# Patient Record
Sex: FEMALE | Race: WHITE | Employment: OTHER | URBAN - METROPOLITAN AREA
[De-identification: names, ages, dates, MRNs, and addresses within clinical notes are randomized per-mention and may not be internally consistent; named-entity substitution may affect disease eponyms.]

---

## 2021-01-01 ENCOUNTER — HOSPITAL ENCOUNTER (INPATIENT)
Age: 71
LOS: 2 days | Discharge: HOSPICE | DRG: 189 | End: 2022-01-01
Attending: EMERGENCY MEDICINE | Admitting: HOSPITALIST
Payer: MEDICARE

## 2021-01-01 ENCOUNTER — APPOINTMENT (OUTPATIENT)
Dept: CT IMAGING | Age: 71
DRG: 189 | End: 2021-01-01
Attending: EMERGENCY MEDICINE
Payer: MEDICARE

## 2021-01-01 ENCOUNTER — APPOINTMENT (OUTPATIENT)
Dept: GENERAL RADIOLOGY | Age: 71
DRG: 189 | End: 2021-01-01
Attending: EMERGENCY MEDICINE
Payer: MEDICARE

## 2021-01-01 ENCOUNTER — HOSPICE ADMISSION (OUTPATIENT)
Dept: HOSPICE | Facility: HOSPICE | Age: 71
End: 2021-01-01
Payer: MEDICARE

## 2021-01-01 DIAGNOSIS — C50.919 METASTATIC BREAST CANCER (HCC): ICD-10-CM

## 2021-01-01 DIAGNOSIS — R06.03 RESPIRATORY DISTRESS: ICD-10-CM

## 2021-01-01 DIAGNOSIS — R04.2 MAJOR HEMOPTYSIS: ICD-10-CM

## 2021-01-01 DIAGNOSIS — R49.0 HOARSE VOICE QUALITY: ICD-10-CM

## 2021-01-01 DIAGNOSIS — E87.6 ACUTE HYPOKALEMIA: ICD-10-CM

## 2021-01-01 DIAGNOSIS — J96.01 ACUTE RESPIRATORY FAILURE WITH HYPOXIA (HCC): Primary | ICD-10-CM

## 2021-01-01 LAB
ABO + RH BLD: NORMAL
ALBUMIN SERPL-MCNC: 2.8 G/DL (ref 3.5–5)
ALBUMIN/GLOB SERPL: 0.6 {RATIO} (ref 1.1–2.2)
ALP SERPL-CCNC: 139 U/L (ref 45–117)
ALT SERPL-CCNC: 30 U/L (ref 12–78)
ANION GAP SERPL CALC-SCNC: 6 MMOL/L (ref 5–15)
ANION GAP SERPL CALC-SCNC: 7 MMOL/L (ref 5–15)
APPEARANCE UR: CLEAR
APTT PPP: 27.2 SEC (ref 22.1–31)
ARTERIAL PATENCY WRIST A: YES
AST SERPL-CCNC: 38 U/L (ref 15–37)
ATRIAL RATE: 122 BPM
BACTERIA URNS QL MICRO: NEGATIVE /HPF
BASE DEFICIT BLDA-SCNC: 0.5 MMOL/L
BASOPHILS # BLD: 0 K/UL (ref 0–0.1)
BASOPHILS NFR BLD: 0 % (ref 0–1)
BDY SITE: NORMAL
BILIRUB SERPL-MCNC: 1.4 MG/DL (ref 0.2–1)
BILIRUB UR QL: NEGATIVE
BLOOD GROUP ANTIBODIES SERPL: NORMAL
BNP SERPL-MCNC: 99 PG/ML
BUN SERPL-MCNC: 8 MG/DL (ref 6–20)
BUN SERPL-MCNC: 8 MG/DL (ref 6–20)
BUN/CREAT SERPL: 12 (ref 12–20)
BUN/CREAT SERPL: 17 (ref 12–20)
CALCIUM SERPL-MCNC: 8.4 MG/DL (ref 8.5–10.1)
CALCIUM SERPL-MCNC: 9 MG/DL (ref 8.5–10.1)
CALCULATED P AXIS, ECG09: 86 DEGREES
CALCULATED R AXIS, ECG10: 29 DEGREES
CALCULATED T AXIS, ECG11: 51 DEGREES
CHLORIDE SERPL-SCNC: 103 MMOL/L (ref 97–108)
CHLORIDE SERPL-SCNC: 110 MMOL/L (ref 97–108)
CO2 SERPL-SCNC: 21 MMOL/L (ref 21–32)
CO2 SERPL-SCNC: 27 MMOL/L (ref 21–32)
COLOR UR: ABNORMAL
COMMENT, HOLDF: NORMAL
COVID-19 RAPID TEST, COVR: NOT DETECTED
CREAT SERPL-MCNC: 0.48 MG/DL (ref 0.55–1.02)
CREAT SERPL-MCNC: 0.68 MG/DL (ref 0.55–1.02)
DIAGNOSIS, 93000: NORMAL
DIFFERENTIAL METHOD BLD: ABNORMAL
EOSINOPHIL # BLD: 0.1 K/UL (ref 0–0.4)
EOSINOPHIL NFR BLD: 1 % (ref 0–7)
EPITH CASTS URNS QL MICRO: ABNORMAL /LPF
ERYTHROCYTE [DISTWIDTH] IN BLOOD BY AUTOMATED COUNT: 14.4 % (ref 11.5–14.5)
ERYTHROCYTE [DISTWIDTH] IN BLOOD BY AUTOMATED COUNT: 14.5 % (ref 11.5–14.5)
GAS FLOW.O2 O2 DELIVERY SYS: 2 L/MIN
GLOBULIN SER CALC-MCNC: 4.6 G/DL (ref 2–4)
GLUCOSE SERPL-MCNC: 132 MG/DL (ref 65–100)
GLUCOSE SERPL-MCNC: 145 MG/DL (ref 65–100)
GLUCOSE UR STRIP.AUTO-MCNC: NEGATIVE MG/DL
HCO3 BLDA-SCNC: 23 MMOL/L (ref 22–26)
HCT VFR BLD AUTO: 38.2 % (ref 35–47)
HCT VFR BLD AUTO: 40.9 % (ref 35–47)
HGB BLD-MCNC: 12.8 G/DL (ref 11.5–16)
HGB BLD-MCNC: 14 G/DL (ref 11.5–16)
HGB UR QL STRIP: ABNORMAL
HYALINE CASTS URNS QL MICRO: ABNORMAL /LPF (ref 0–5)
IMM GRANULOCYTES # BLD AUTO: 0 K/UL (ref 0–0.04)
IMM GRANULOCYTES NFR BLD AUTO: 0 % (ref 0–0.5)
INR PPP: 1.1 (ref 0.9–1.1)
KETONES UR QL STRIP.AUTO: ABNORMAL MG/DL
LEUKOCYTE ESTERASE UR QL STRIP.AUTO: ABNORMAL
LYMPHOCYTES # BLD: 1 K/UL (ref 0.8–3.5)
LYMPHOCYTES NFR BLD: 10 % (ref 12–49)
MCH RBC QN AUTO: 31.2 PG (ref 26–34)
MCH RBC QN AUTO: 32.2 PG (ref 26–34)
MCHC RBC AUTO-ENTMCNC: 33.5 G/DL (ref 30–36.5)
MCHC RBC AUTO-ENTMCNC: 34.2 G/DL (ref 30–36.5)
MCV RBC AUTO: 93.2 FL (ref 80–99)
MCV RBC AUTO: 94 FL (ref 80–99)
MONOCYTES # BLD: 0.9 K/UL (ref 0–1)
MONOCYTES NFR BLD: 8 % (ref 5–13)
NEUTS SEG # BLD: 8.7 K/UL (ref 1.8–8)
NEUTS SEG NFR BLD: 81 % (ref 32–75)
NITRITE UR QL STRIP.AUTO: NEGATIVE
NRBC # BLD: 0 K/UL (ref 0–0.01)
NRBC # BLD: 0 K/UL (ref 0–0.01)
NRBC BLD-RTO: 0 PER 100 WBC
NRBC BLD-RTO: 0 PER 100 WBC
P-R INTERVAL, ECG05: 158 MS
PCO2 BLDA: 36 MMHG (ref 35–45)
PH BLDA: 7.43 [PH] (ref 7.35–7.45)
PH UR STRIP: 6.5 [PH] (ref 5–8)
PLATELET # BLD AUTO: 359 K/UL (ref 150–400)
PLATELET # BLD AUTO: 397 K/UL (ref 150–400)
PMV BLD AUTO: 8.6 FL (ref 8.9–12.9)
PMV BLD AUTO: 8.6 FL (ref 8.9–12.9)
PO2 BLDA: 89 MMHG (ref 80–100)
POTASSIUM SERPL-SCNC: 3.3 MMOL/L (ref 3.5–5.1)
POTASSIUM SERPL-SCNC: 4.6 MMOL/L (ref 3.5–5.1)
PROT SERPL-MCNC: 7.4 G/DL (ref 6.4–8.2)
PROT UR STRIP-MCNC: ABNORMAL MG/DL
PROTHROMBIN TIME: 11.4 SEC (ref 9–11.1)
Q-T INTERVAL, ECG07: 328 MS
QRS DURATION, ECG06: 62 MS
QTC CALCULATION (BEZET), ECG08: 467 MS
RBC # BLD AUTO: 4.1 M/UL (ref 3.8–5.2)
RBC # BLD AUTO: 4.35 M/UL (ref 3.8–5.2)
RBC #/AREA URNS HPF: >100 /HPF (ref 0–5)
SAMPLES BEING HELD,HOLD: NORMAL
SAO2 % BLD: 97 % (ref 92–97)
SAO2% DEVICE SAO2% SENSOR NAME: NORMAL
SODIUM SERPL-SCNC: 136 MMOL/L (ref 136–145)
SODIUM SERPL-SCNC: 138 MMOL/L (ref 136–145)
SOURCE, COVRS: NORMAL
SP GR UR REFRACTOMETRY: 1.01 (ref 1–1.03)
SPECIMEN EXP DATE BLD: NORMAL
SPECIMEN SITE: NORMAL
THERAPEUTIC RANGE,PTTT: NORMAL SECS (ref 58–77)
TROPONIN-HIGH SENSITIVITY: 25 NG/L (ref 0–51)
UA: UC IF INDICATED,UAUC: ABNORMAL
UROBILINOGEN UR QL STRIP.AUTO: 1 EU/DL (ref 0.2–1)
VENTRICULAR RATE, ECG03: 122 BPM
WBC # BLD AUTO: 10.8 K/UL (ref 3.6–11)
WBC # BLD AUTO: 13.8 K/UL (ref 3.6–11)
WBC URNS QL MICRO: ABNORMAL /HPF (ref 0–4)

## 2021-01-01 PROCEDURE — 36415 COLL VENOUS BLD VENIPUNCTURE: CPT

## 2021-01-01 PROCEDURE — 94760 N-INVAS EAR/PLS OXIMETRY 1: CPT

## 2021-01-01 PROCEDURE — 36600 WITHDRAWAL OF ARTERIAL BLOOD: CPT

## 2021-01-01 PROCEDURE — 84484 ASSAY OF TROPONIN QUANT: CPT

## 2021-01-01 PROCEDURE — 80048 BASIC METABOLIC PNL TOTAL CA: CPT

## 2021-01-01 PROCEDURE — 83880 ASSAY OF NATRIURETIC PEPTIDE: CPT

## 2021-01-01 PROCEDURE — 65660000000 HC RM CCU STEPDOWN

## 2021-01-01 PROCEDURE — 86900 BLOOD TYPING SEROLOGIC ABO: CPT

## 2021-01-01 PROCEDURE — 80053 COMPREHEN METABOLIC PANEL: CPT

## 2021-01-01 PROCEDURE — 94640 AIRWAY INHALATION TREATMENT: CPT

## 2021-01-01 PROCEDURE — 74011000250 HC RX REV CODE- 250: Performed by: EMERGENCY MEDICINE

## 2021-01-01 PROCEDURE — 71045 X-RAY EXAM CHEST 1 VIEW: CPT

## 2021-01-01 PROCEDURE — 77010033678 HC OXYGEN DAILY

## 2021-01-01 PROCEDURE — 99285 EMERGENCY DEPT VISIT HI MDM: CPT

## 2021-01-01 PROCEDURE — 87635 SARS-COV-2 COVID-19 AMP PRB: CPT

## 2021-01-01 PROCEDURE — 85610 PROTHROMBIN TIME: CPT

## 2021-01-01 PROCEDURE — 74011250636 HC RX REV CODE- 250/636: Performed by: HOSPITALIST

## 2021-01-01 PROCEDURE — 74011000250 HC RX REV CODE- 250: Performed by: HOSPITALIST

## 2021-01-01 PROCEDURE — 85730 THROMBOPLASTIN TIME PARTIAL: CPT

## 2021-01-01 PROCEDURE — 74011250637 HC RX REV CODE- 250/637: Performed by: HOSPITALIST

## 2021-01-01 PROCEDURE — 74177 CT ABD & PELVIS W/CONTRAST: CPT

## 2021-01-01 PROCEDURE — 85027 COMPLETE CBC AUTOMATED: CPT

## 2021-01-01 PROCEDURE — 93005 ELECTROCARDIOGRAM TRACING: CPT

## 2021-01-01 PROCEDURE — 74011250636 HC RX REV CODE- 250/636: Performed by: EMERGENCY MEDICINE

## 2021-01-01 PROCEDURE — 85025 COMPLETE CBC W/AUTO DIFF WBC: CPT

## 2021-01-01 PROCEDURE — 74011000636 HC RX REV CODE- 636: Performed by: EMERGENCY MEDICINE

## 2021-01-01 PROCEDURE — 96374 THER/PROPH/DIAG INJ IV PUSH: CPT

## 2021-01-01 PROCEDURE — 81001 URINALYSIS AUTO W/SCOPE: CPT

## 2021-01-01 PROCEDURE — 71275 CT ANGIOGRAPHY CHEST: CPT

## 2021-01-01 PROCEDURE — 82803 BLOOD GASES ANY COMBINATION: CPT

## 2021-01-01 RX ORDER — ENOXAPARIN SODIUM 100 MG/ML
40 INJECTION SUBCUTANEOUS DAILY
Status: DISCONTINUED | OUTPATIENT
Start: 2021-01-01 | End: 2021-01-01 | Stop reason: CLARIF

## 2021-01-01 RX ORDER — ACETAMINOPHEN 650 MG/1
650 SUPPOSITORY RECTAL
Status: DISCONTINUED | OUTPATIENT
Start: 2021-01-01 | End: 2022-01-01 | Stop reason: HOSPADM

## 2021-01-01 RX ORDER — POLYETHYLENE GLYCOL 3350 17 G/17G
17 POWDER, FOR SOLUTION ORAL DAILY PRN
Status: DISCONTINUED | OUTPATIENT
Start: 2021-01-01 | End: 2022-01-01 | Stop reason: HOSPADM

## 2021-01-01 RX ORDER — FLUNISOLIDE 0.25 MG/ML
1 SOLUTION NASAL DAILY
Status: DISCONTINUED | OUTPATIENT
Start: 2021-01-01 | End: 2021-01-01 | Stop reason: CLARIF

## 2021-01-01 RX ORDER — ACETAMINOPHEN 325 MG/1
650 TABLET ORAL
Status: DISCONTINUED | OUTPATIENT
Start: 2021-01-01 | End: 2022-01-01 | Stop reason: HOSPADM

## 2021-01-01 RX ORDER — PROCHLORPERAZINE MALEATE 10 MG
10 TABLET ORAL
Status: DISCONTINUED | OUTPATIENT
Start: 2021-01-01 | End: 2022-01-01 | Stop reason: HOSPADM

## 2021-01-01 RX ORDER — SODIUM CHLORIDE AND POTASSIUM CHLORIDE .9; .15 G/100ML; G/100ML
SOLUTION INTRAVENOUS CONTINUOUS
Status: DISPENSED | OUTPATIENT
Start: 2021-01-01 | End: 2021-01-01

## 2021-01-01 RX ORDER — ONDANSETRON 2 MG/ML
4 INJECTION INTRAMUSCULAR; INTRAVENOUS
Status: DISCONTINUED | OUTPATIENT
Start: 2021-01-01 | End: 2022-01-01 | Stop reason: HOSPADM

## 2021-01-01 RX ORDER — SODIUM CHLORIDE 0.9 % (FLUSH) 0.9 %
5-40 SYRINGE (ML) INJECTION EVERY 8 HOURS
Status: DISCONTINUED | OUTPATIENT
Start: 2021-01-01 | End: 2022-01-01 | Stop reason: HOSPADM

## 2021-01-01 RX ORDER — LEVALBUTEROL INHALATION SOLUTION 1.25 MG/3ML
1.25 SOLUTION RESPIRATORY (INHALATION)
Status: COMPLETED | OUTPATIENT
Start: 2021-01-01 | End: 2021-01-01

## 2021-01-01 RX ORDER — LORAZEPAM 0.5 MG/1
0.5 TABLET ORAL
COMMUNITY
Start: 2022-01-01 | End: 2022-01-01

## 2021-01-01 RX ORDER — GUAIFENESIN 600 MG/1
1200 TABLET, EXTENDED RELEASE ORAL 2 TIMES DAILY
Status: DISCONTINUED | OUTPATIENT
Start: 2021-01-01 | End: 2022-01-01 | Stop reason: HOSPADM

## 2021-01-01 RX ORDER — HYDROCODONE POLISTIREX AND CHLORPHENIRAMINE POLISTIREX 10; 8 MG/5ML; MG/5ML
5 SUSPENSION, EXTENDED RELEASE ORAL
COMMUNITY
Start: 2022-01-01

## 2021-01-01 RX ORDER — MEGESTROL ACETATE 40 MG/ML
800 SUSPENSION ORAL DAILY
COMMUNITY

## 2021-01-01 RX ORDER — GUAIFENESIN 1200 MG/1
1200 TABLET, EXTENDED RELEASE ORAL 2 TIMES DAILY
COMMUNITY
Start: 2022-01-01 | End: 2022-01-01

## 2021-01-01 RX ORDER — PREDNISONE 20 MG/1
20 TABLET ORAL
Status: DISCONTINUED | OUTPATIENT
Start: 2022-01-01 | End: 2022-01-01 | Stop reason: HOSPADM

## 2021-01-01 RX ORDER — LORATADINE 10 MG/1
10 TABLET ORAL DAILY
COMMUNITY
Start: 2022-01-01

## 2021-01-01 RX ORDER — LEVETIRACETAM 500 MG/1
500 TABLET ORAL 2 TIMES DAILY
COMMUNITY
Start: 2022-01-01 | End: 2022-01-01

## 2021-01-01 RX ORDER — MEGESTROL ACETATE 40 MG/ML
800 SUSPENSION ORAL DAILY
Status: DISCONTINUED | OUTPATIENT
Start: 2021-01-01 | End: 2022-01-01 | Stop reason: HOSPADM

## 2021-01-01 RX ORDER — SODIUM CHLORIDE 0.9 % (FLUSH) 0.9 %
5-40 SYRINGE (ML) INJECTION AS NEEDED
Status: DISCONTINUED | OUTPATIENT
Start: 2021-01-01 | End: 2022-01-01 | Stop reason: HOSPADM

## 2021-01-01 RX ORDER — HEPARIN SODIUM 5000 [USP'U]/ML
5000 INJECTION, SOLUTION INTRAVENOUS; SUBCUTANEOUS EVERY 12 HOURS
Status: DISCONTINUED | OUTPATIENT
Start: 2021-01-01 | End: 2021-01-01

## 2021-01-01 RX ORDER — LEVALBUTEROL INHALATION SOLUTION 0.63 MG/3ML
0.63 SOLUTION RESPIRATORY (INHALATION)
Status: DISCONTINUED | OUTPATIENT
Start: 2021-01-01 | End: 2022-01-01 | Stop reason: HOSPADM

## 2021-01-01 RX ORDER — LEVETIRACETAM 500 MG/1
500 TABLET ORAL 2 TIMES DAILY
Status: DISCONTINUED | OUTPATIENT
Start: 2021-01-01 | End: 2022-01-01 | Stop reason: HOSPADM

## 2021-01-01 RX ORDER — ONDANSETRON HYDROCHLORIDE 8 MG/1
8 TABLET, FILM COATED ORAL
COMMUNITY
Start: 2022-01-01

## 2021-01-01 RX ORDER — HYDROCODONE POLISTIREX AND CHLORPHENIRAMINE POLISTIREX 10; 8 MG/5ML; MG/5ML
5 SUSPENSION, EXTENDED RELEASE ORAL
Status: DISCONTINUED | OUTPATIENT
Start: 2021-01-01 | End: 2021-01-01 | Stop reason: SDUPTHER

## 2021-01-01 RX ORDER — PROCHLORPERAZINE MALEATE 10 MG
10 TABLET ORAL
COMMUNITY
Start: 2022-01-01 | End: 2022-01-01 | Stop reason: CLARIF

## 2021-01-01 RX ORDER — LORAZEPAM 0.5 MG/1
0.5 TABLET ORAL
Status: DISCONTINUED | OUTPATIENT
Start: 2021-01-01 | End: 2022-01-01 | Stop reason: HOSPADM

## 2021-01-01 RX ORDER — OXYCODONE AND ACETAMINOPHEN 5; 325 MG/1; MG/1
1 TABLET ORAL
Status: DISCONTINUED | OUTPATIENT
Start: 2021-01-01 | End: 2022-01-01 | Stop reason: HOSPADM

## 2021-01-01 RX ORDER — HYDROCODONE POLISTIREX AND CHLORPHENIRAMINE POLISTIREX 10; 8 MG/5ML; MG/5ML
5 SUSPENSION, EXTENDED RELEASE ORAL
Status: DISCONTINUED | OUTPATIENT
Start: 2021-01-01 | End: 2022-01-01 | Stop reason: HOSPADM

## 2021-01-01 RX ORDER — ONDANSETRON 4 MG/1
4 TABLET, ORALLY DISINTEGRATING ORAL
Status: DISCONTINUED | OUTPATIENT
Start: 2021-01-01 | End: 2022-01-01 | Stop reason: HOSPADM

## 2021-01-01 RX ORDER — CETIRIZINE HCL 10 MG
10 TABLET ORAL DAILY
Status: DISCONTINUED | OUTPATIENT
Start: 2021-01-01 | End: 2022-01-01 | Stop reason: HOSPADM

## 2021-01-01 RX ADMIN — LEVALBUTEROL HYDROCHLORIDE 1.25 MG: 1.25 SOLUTION RESPIRATORY (INHALATION) at 06:09

## 2021-01-01 RX ADMIN — IOPAMIDOL 100 ML: 755 INJECTION, SOLUTION INTRAVENOUS at 05:17

## 2021-01-01 RX ADMIN — GUAIFENESIN 1200 MG: 600 TABLET, EXTENDED RELEASE ORAL at 17:10

## 2021-01-01 RX ADMIN — Medication 10 ML: at 22:00

## 2021-01-01 RX ADMIN — METHYLPREDNISOLONE SODIUM SUCCINATE 125 MG: 125 INJECTION, POWDER, FOR SOLUTION INTRAMUSCULAR; INTRAVENOUS at 04:36

## 2021-01-01 RX ADMIN — METHYLPREDNISOLONE SODIUM SUCCINATE 40 MG: 40 INJECTION, POWDER, FOR SOLUTION INTRAMUSCULAR; INTRAVENOUS at 17:32

## 2021-01-01 RX ADMIN — LEVETIRACETAM 500 MG: 500 TABLET, FILM COATED ORAL at 10:52

## 2021-01-01 RX ADMIN — GUAIFENESIN 1200 MG: 600 TABLET, EXTENDED RELEASE ORAL at 18:53

## 2021-01-01 RX ADMIN — MEGESTROL ACETATE 800 MG: 40 SUSPENSION ORAL at 13:50

## 2021-01-01 RX ADMIN — METHYLPREDNISOLONE SODIUM SUCCINATE 40 MG: 40 INJECTION, POWDER, FOR SOLUTION INTRAMUSCULAR; INTRAVENOUS at 10:53

## 2021-01-01 RX ADMIN — POTASSIUM CHLORIDE AND SODIUM CHLORIDE: 900; 150 INJECTION, SOLUTION INTRAVENOUS at 09:49

## 2021-01-01 RX ADMIN — HYDROCODONE POLISTIREX AND CHLORPHENIRAMINE POLISTIREX 5 ML: 10; 8 SUSPENSION, EXTENDED RELEASE ORAL at 20:54

## 2021-01-01 RX ADMIN — HYDROCODONE POLISTIREX AND CHLORPHENIRAMINE POLISTIREX 5 ML: 10; 8 SUSPENSION, EXTENDED RELEASE ORAL at 23:03

## 2021-01-01 RX ADMIN — Medication 10 ML: at 09:53

## 2021-01-01 RX ADMIN — METHYLPREDNISOLONE SODIUM SUCCINATE 40 MG: 40 INJECTION, POWDER, FOR SOLUTION INTRAMUSCULAR; INTRAVENOUS at 03:42

## 2021-01-01 RX ADMIN — GUAIFENESIN 1200 MG: 600 TABLET, EXTENDED RELEASE ORAL at 10:51

## 2021-01-01 RX ADMIN — LEVETIRACETAM 500 MG: 500 TABLET, FILM COATED ORAL at 18:53

## 2021-01-01 RX ADMIN — Medication 10 ML: at 14:00

## 2021-01-01 RX ADMIN — LEVALBUTEROL HYDROCHLORIDE 0.63 MG: 0.63 SOLUTION RESPIRATORY (INHALATION) at 22:10

## 2021-01-01 RX ADMIN — LEVALBUTEROL HYDROCHLORIDE 0.63 MG: 0.63 SOLUTION RESPIRATORY (INHALATION) at 19:44

## 2021-01-01 RX ADMIN — Medication 10 ML: at 11:00

## 2021-01-01 RX ADMIN — CETIRIZINE HYDROCHLORIDE 10 MG: 10 TABLET, FILM COATED ORAL at 10:57

## 2021-01-01 RX ADMIN — METHYLPREDNISOLONE SODIUM SUCCINATE 40 MG: 40 INJECTION, POWDER, FOR SOLUTION INTRAMUSCULAR; INTRAVENOUS at 09:42

## 2021-01-01 RX ADMIN — MEGESTROL ACETATE 800 MG: 40 SUSPENSION ORAL at 10:52

## 2021-01-01 RX ADMIN — Medication 10 ML: at 05:50

## 2021-01-01 RX ADMIN — CETIRIZINE HYDROCHLORIDE 10 MG: 10 TABLET, FILM COATED ORAL at 09:40

## 2021-01-01 RX ADMIN — LEVETIRACETAM 500 MG: 500 TABLET, FILM COATED ORAL at 09:41

## 2021-01-01 RX ADMIN — GUAIFENESIN 1200 MG: 600 TABLET, EXTENDED RELEASE ORAL at 09:40

## 2021-01-01 RX ADMIN — LEVETIRACETAM 500 MG: 500 TABLET, FILM COATED ORAL at 17:10

## 2021-01-01 RX ADMIN — POTASSIUM BICARBONATE 40 MEQ: 782 TABLET, EFFERVESCENT ORAL at 09:42

## 2021-12-30 PROBLEM — J96.01 ACUTE RESPIRATORY FAILURE WITH HYPOXIA (HCC): Status: ACTIVE | Noted: 2021-01-01

## 2021-12-30 PROBLEM — C50.919 METASTATIC BREAST CANCER (HCC): Status: ACTIVE | Noted: 2021-01-01

## 2021-12-30 PROBLEM — E87.6 HYPOKALEMIA: Status: ACTIVE | Noted: 2021-01-01

## 2021-12-30 NOTE — ED NOTES
Patient arrives from home via EMS for shortness of breath.  Hx of Stage 4 Breast Cx with lymph nodes swollen near wind pipe causing increased work of breathing    Patient states she was having generalized chest tightness prior to coming by ems    Patient states she has coughed up blood past 2 days which is new, also had bloody vaginal discharge x1 which is also new

## 2021-12-30 NOTE — ED PROVIDER NOTES
EMERGENCY DEPARTMENT HISTORY AND PHYSICAL EXAM      Please note that this dictation was completed with the assistance of \"Dragon\", the computer voice recognition software. Quite often unanticipated grammatical, syntax, homophones, and other interpretive errors are inadvertently transcribed by the computer software. Please disregard these errors and any errors that have escaped final proofreading. Thank you. Patient: Daniel Valle  DOS: 19  : 1950  MRN: [id number]  History of Presenting Illness     Chief Complaint   Patient presents with    Shortness of Breath     Patient arrives from home via EMS for shortness of breath. Hx of Stage 4 Breast Cx with lymph nodes swollen near wind pipe causing increased work of breathing    Chest Pain     Patient states she was having generalized chest tightness prior to coming by ems    Other     Patient states she has cough up blood past 2 days which is new, also had bloody vaginal discharge x1 which is also new     History Provided By: Patient/family/EMS (if applicable)    HPI: Daniel Valle, 66 y.o. female with past medical history as documented below presents to the ED with c/o of acute onset of SOB, hemoptysis and possibly vaginal bleeding occurring just PTA. Pt has hx of stage 4 breast cancer with lymph nodes swollen around windpipe and states she's had increased SBO. Also notes having blood tinged sputum when coughing the past couple days. Pt arrives in respiratory distress, tachycardic and sats in the low 80%. Pt denies any other exacerbating or ameliorating factors. Additionally, pt specifically denies any recent fever, chills, headache, nausea, vomiting, abdominal pain, lightheadedness, dizziness, numbness, weakness, lower extremity swelling, heart palpitations, urinary sxs, diarrhea, constipation, melena, hematochezia. There are no other complaints, changes or physical findings pertinent to the HPI at this time.     PCP: Eris Sterling, DO  Past History   Past Medical History:  Stage 4 breast cancer    Past Surgical History:  Denies    Family History:   Family history reviewed and was non-contributory, unless specified below:    Social History:  Social History     Tobacco Use    Smoking status: Not on file    Smokeless tobacco: Not on file   Substance Use Topics    Alcohol use: Not on file    Drug use: Not on file       Allergies: Allergies   Allergen Reactions    Barley Other (comments)     GI problems    Black Pepper Other (comments)     GI problems    Cinnamon Other (comments)     GI problems    Cottonseed Oil Other (comments)     GI problems    Malt Extract Other (comments)     GI problems    Oats Other (comments)     GI problems    Pineapple Other (comments)     GI problems       Current Medications:  No current facility-administered medications on file prior to encounter. Current Outpatient Medications on File Prior to Encounter   Medication Sig Dispense Refill    levETIRAcetam (Keppra) 500 mg tablet Take 500 mg by mouth two (2) times a day.  megestroL (MEGACE) 400 mg/10 mL (40 mg/mL) suspension Take 800 mg by mouth daily.  LORazepam (Ativan) 0.5 mg tablet Take 0.5 mg by mouth every six (6) hours as needed for Anxiety.  HYDROcodone-chlorpheniramine (TUSSIONEX) 10-8 mg/5 mL suspension Take 5 mL by mouth every twelve (12) hours as needed for Cough.  prochlorperazine (COMPAZINE) 10 mg tablet Take 10 mg by mouth every six (6) hours as needed for Nausea or Vomiting.  ondansetron hcl (Zofran) 8 mg tablet Take 8 mg by mouth every eight (8) hours as needed for Nausea or Vomiting.  loratadine (CLARITIN) 10 mg tablet Take 10 mg by mouth daily.  guaiFENesin (Mucinex) 1,200 mg Ta12 ER tablet Take 1,200 mg by mouth two (2) times a day.  fluticasone propionate (FLONASE NA) 1 Spray by Nasal route daily.        Review of Systems   A complete ROS was reviewed by me today and all other systems negative, unless otherwise specified below:  Review of Systems   Constitutional: Positive for activity change and appetite change. Negative for chills and fever. HENT: Negative. Negative for congestion and sore throat. Eyes: Negative. Respiratory: Positive for cough, chest tightness and shortness of breath. Negative for wheezing. Cardiovascular: Positive for chest pain. Negative for palpitations and leg swelling. Gastrointestinal: Negative. Negative for abdominal distention, abdominal pain, blood in stool, constipation, diarrhea, nausea and vomiting. Endocrine: Negative. Genitourinary: Positive for vaginal bleeding. Negative for dysuria, flank pain, frequency, hematuria and urgency. Musculoskeletal: Negative. Negative for arthralgias, back pain and myalgias. Skin: Negative. Negative for color change and rash. Neurological: Negative. Negative for dizziness, syncope, speech difficulty, weakness, light-headedness, numbness and headaches. Hematological: Negative. Psychiatric/Behavioral: Negative. Negative for confusion and self-injury. The patient is not nervous/anxious. All other systems reviewed and are negative. Physical Exam   Physical Exam  Vitals and nursing note reviewed. Constitutional:       General: She is in acute distress. Appearance: She is well-developed. She is ill-appearing, toxic-appearing and diaphoretic. HENT:      Head: Normocephalic and atraumatic. Mouth/Throat:      Pharynx: No oropharyngeal exudate. Eyes:      Conjunctiva/sclera: Conjunctivae normal.   Cardiovascular:      Rate and Rhythm: Regular rhythm. Tachycardia present. Heart sounds: Normal heart sounds. Pulmonary:      Effort: Tachypnea, accessory muscle usage and respiratory distress present. Breath sounds: Normal breath sounds. No wheezing or rales. Chest:      Chest wall: No tenderness. Abdominal:      General: Bowel sounds are normal. There is no distension.       Palpations: Abdomen is soft. There is no mass. Tenderness: There is no abdominal tenderness. There is no guarding or rebound. Musculoskeletal:         General: Normal range of motion. Cervical back: Normal range of motion. Skin:     General: Skin is warm. Neurological:      Mental Status: She is alert and oriented to person, place, and time. Cranial Nerves: No cranial nerve deficit. Motor: No abnormal muscle tone. Diagnostic Study Results     Laboratory Data  I have personally reviewed and interpreted all available laboratory results. Recent Results (from the past 24 hour(s))   CBC WITH AUTOMATED DIFF    Collection Time: 12/30/21  3:42 AM   Result Value Ref Range    WBC 10.8 3.6 - 11.0 K/uL    RBC 4.35 3.80 - 5.20 M/uL    HGB 14.0 11.5 - 16.0 g/dL    HCT 40.9 35.0 - 47.0 %    MCV 94.0 80.0 - 99.0 FL    MCH 32.2 26.0 - 34.0 PG    MCHC 34.2 30.0 - 36.5 g/dL    RDW 14.5 11.5 - 14.5 %    PLATELET 245 608 - 013 K/uL    MPV 8.6 (L) 8.9 - 12.9 FL    NRBC 0.0 0  WBC    ABSOLUTE NRBC 0.00 0.00 - 0.01 K/uL    NEUTROPHILS 81 (H) 32 - 75 %    LYMPHOCYTES 10 (L) 12 - 49 %    MONOCYTES 8 5 - 13 %    EOSINOPHILS 1 0 - 7 %    BASOPHILS 0 0 - 1 %    IMMATURE GRANULOCYTES 0 0.0 - 0.5 %    ABS. NEUTROPHILS 8.7 (H) 1.8 - 8.0 K/UL    ABS. LYMPHOCYTES 1.0 0.8 - 3.5 K/UL    ABS. MONOCYTES 0.9 0.0 - 1.0 K/UL    ABS. EOSINOPHILS 0.1 0.0 - 0.4 K/UL    ABS. BASOPHILS 0.0 0.0 - 0.1 K/UL    ABS. IMM.  GRANS. 0.0 0.00 - 0.04 K/UL    DF AUTOMATED     METABOLIC PANEL, COMPREHENSIVE    Collection Time: 12/30/21  3:42 AM   Result Value Ref Range    Sodium 136 136 - 145 mmol/L    Potassium 3.3 (L) 3.5 - 5.1 mmol/L    Chloride 103 97 - 108 mmol/L    CO2 27 21 - 32 mmol/L    Anion gap 6 5 - 15 mmol/L    Glucose 132 (H) 65 - 100 mg/dL    BUN 8 6 - 20 MG/DL    Creatinine 0.68 0.55 - 1.02 MG/DL    BUN/Creatinine ratio 12 12 - 20      GFR est AA >60 >60 ml/min/1.73m2    GFR est non-AA >60 >60 ml/min/1.73m2    Calcium 9.0 8.5 - 10.1 MG/DL    Bilirubin, total 1.4 (H) 0.2 - 1.0 MG/DL    ALT (SGPT) 30 12 - 78 U/L    AST (SGOT) 38 (H) 15 - 37 U/L    Alk. phosphatase 139 (H) 45 - 117 U/L    Protein, total 7.4 6.4 - 8.2 g/dL    Albumin 2.8 (L) 3.5 - 5.0 g/dL    Globulin 4.6 (H) 2.0 - 4.0 g/dL    A-G Ratio 0.6 (L) 1.1 - 2.2     TROPONIN-HIGH SENSITIVITY    Collection Time: 12/30/21  3:42 AM   Result Value Ref Range    Troponin-High Sensitivity 25 0 - 51 ng/L   NT-PRO BNP    Collection Time: 12/30/21  3:42 AM   Result Value Ref Range    NT pro-BNP 99 <125 PG/ML   EKG, 12 LEAD, INITIAL    Collection Time: 12/30/21  3:45 AM   Result Value Ref Range    Ventricular Rate 122 BPM    Atrial Rate 122 BPM    P-R Interval 158 ms    QRS Duration 62 ms    Q-T Interval 328 ms    QTC Calculation (Bezet) 467 ms    Calculated P Axis 86 degrees    Calculated R Axis 29 degrees    Calculated T Axis 51 degrees    Diagnosis       Sinus tachycardia  Possible Left atrial enlargement  Possible Anterior infarct , age undetermined  No previous ECGs available     PTT    Collection Time: 12/30/21  3:55 AM   Result Value Ref Range    aPTT 27.2 22.1 - 31.0 sec    aPTT, therapeutic range     58.0 - 77.0 SECS   PROTHROMBIN TIME + INR    Collection Time: 12/30/21  3:55 AM   Result Value Ref Range    INR 1.1 0.9 - 1.1      Prothrombin time 11.4 (H) 9.0 - 11.1 sec   TYPE & SCREEN    Collection Time: 12/30/21  3:55 AM   Result Value Ref Range    Crossmatch Expiration 01/02/2022,2359     ABO/Rh(D) O NEGATIVE     Antibody screen NEG    SAMPLES BEING HELD    Collection Time: 12/30/21  3:55 AM   Result Value Ref Range    SAMPLES BEING HELD BL     COMMENT        Add-on orders for these samples will be processed based on acceptable specimen integrity and analyte stability, which may vary by analyte.    COVID-19 RAPID TEST    Collection Time: 12/30/21  4:27 AM   Result Value Ref Range    Specimen source Nasopharyngeal      COVID-19 rapid test Not detected NOTD     URINALYSIS W/ REFLEX CULTURE    Collection Time: 12/30/21  5:03 AM    Specimen: Urine   Result Value Ref Range    Color YELLOW/STRAW      Appearance CLEAR CLEAR      Specific gravity 1.006 1.003 - 1.030      pH (UA) 6.5 5.0 - 8.0      Protein TRACE (A) NEG mg/dL    Glucose Negative NEG mg/dL    Ketone TRACE (A) NEG mg/dL    Bilirubin Negative NEG      Blood LARGE (A) NEG      Urobilinogen 1.0 0.2 - 1.0 EU/dL    Nitrites Negative NEG      Leukocyte Esterase TRACE (A) NEG      WBC 0-4 0 - 4 /hpf    RBC >100 (H) 0 - 5 /hpf    Epithelial cells FEW FEW /lpf    Bacteria Negative NEG /hpf    UA:UC IF INDICATED CULTURE NOT INDICATED BY UA RESULT CNI      Hyaline cast 0-2 0 - 5 /lpf   BLOOD GAS, ARTERIAL    Collection Time: 12/30/21  6:04 AM   Result Value Ref Range    pH 7.43 7.35 - 7.45      PCO2 36 35 - 45 mmHg    PO2 89 80 - 100 mmHg    O2 SAT 97 92 - 97 %    BICARBONATE 23 22 - 26 mmol/L    BASE DEFICIT 0.5 mmol/L    O2 METHOD NASAL CANNULA      O2 FLOW RATE 2.00 L/min    Sample source ARTERIAL      SITE LEFT RADIAL      BREE'S TEST YES         Radiologic Studies   I have personally reviewed and interpreted all available imaging studies and agree with radiology interpretation. CTA CHEST W OR W WO CONT   Final Result   Extensive hilar and mediastinal lymphadenopathy with left axillary adenopathy   and supraclavicular adenopathy as well. Right upper lobe pleural-based mass with numerous scattered pulmonary nodular   densities. Scattered intimal lytic osseous lesions. Left adrenal mass lesion. Imaging findings consistent with widespread metastatic disease. No evidence of pulmonary embolism. No aortic aneurysm or dissection. Mild centrilobular emphysematous change. No acute process is identified in the chest, abdomen or pelvis. CT ABD PELV W CONT   Final Result   Extensive hilar and mediastinal lymphadenopathy with left axillary adenopathy   and supraclavicular adenopathy as well.    Right upper lobe pleural-based mass with numerous scattered pulmonary nodular   densities. Scattered intimal lytic osseous lesions. Left adrenal mass lesion. Imaging findings consistent with widespread metastatic disease. No evidence of pulmonary embolism. No aortic aneurysm or dissection. Mild centrilobular emphysematous change. No acute process is identified in the chest, abdomen or pelvis. XR CHEST PORT   Final Result   Mild lateral hilar prominence. Hilar adenopathy is not excluded. Axillary surgical clips bilaterally. CT Results  (Last 48 hours)               12/30/21 0517  CTA CHEST W OR W WO CONT Final result    Impression:  Extensive hilar and mediastinal lymphadenopathy with left axillary adenopathy   and supraclavicular adenopathy as well. Right upper lobe pleural-based mass with numerous scattered pulmonary nodular   densities. Scattered intimal lytic osseous lesions. Left adrenal mass lesion. Imaging findings consistent with widespread metastatic disease. No evidence of pulmonary embolism. No aortic aneurysm or dissection. Mild centrilobular emphysematous change. No acute process is identified in the chest, abdomen or pelvis. Narrative:      CLINICAL HISTORY: Hemoptysis, chest pain, lung cancer       INDICATION:  Hemoptysis, chest pain and lung cancer   COMPARISON:  None       TECHNIQUE:    Following the uneventful intravenous administration of 100 cc Isovue-370, thin   axial images were obtained through the chest, abdomen and pelvis. Coronal and   sagittal reconstructions were generated. Oral contrast was not administered. CT dose reduction was achieved through use of a standardized protocol tailored   for this examination and automatic exposure control for dose modulation. Adaptive statistical iterative reconstruction (ASIR) was utilized. For the chest portion of the examination only;    3-D MIP reconstructed imaging was obtained.    FINDINGS:    VASCULATURE: Supraclavicular adenopathy. Thyroid hypodensities are not   characterized. No aortic aneurysm or dissection. No proximal pulmonary embolism is identified. MEDIASTINUM/HEART: There is extensive mediastinal and hilar lymphadenopathy. Pericardial effusion. Attenuated pulmonary vasculature. PLEURA/LUNGS: Pleural based thickening/soft tissue mass on the rightScattered   groundglass opacities. There is no pleural or pericardial effusion. SOFT TISSUE/ AXILLA: Left axillary lymphadenopathy. Axillary surgical clips on   the right and on the left. LIVER/GALLBLADDER: No mass or biliary dilatation. There is no intrahepatic duct dilatation. The CBD is not dilated. There is no   hepatic parenchymal mass. Hepatic enhancement pattern is within normal limits. The portal vein is patent. SPLEEN/PANCREAS: No mass. . There is no pancreatic mass. There is no pancreatic   duct dilatation. There is no evidence of splenomegaly. ADRENALS/KIDNEYS: Left adrenal mass lesion 10 x 17 mm. Renal cortical thinning   on the right. . There There is no hydroureter or hydronephrosis. There is no   perinephric mass. No ureteral or bladder calculus. STOMACH, COLON AND SMALL BOWEL: Few diverticula. There is no obstruction or free   intraperitoneal air. There is no evidence of incarceration or obstruction. No   mesenteric adenopathy. APPENDIX: Unremarkable. PERITONEUM/RETROPERITONEUM: There is no abdominal aortic aneurysm. No   significant lymphadenopathy. URINARY BLADDER: No mass or calculus. PELVIS: There is no pelvic adenopathy. There is no pelvic sidewall mass. There   is no inguinal adenopathy. BONES: Lytic lesion at T2 and at T1. [place] No evidence of fracture or dislocation. 12/30/21 0517  CT ABD PELV W CONT Final result    Impression:  Extensive hilar and mediastinal lymphadenopathy with left axillary adenopathy   and supraclavicular adenopathy as well.    Right upper lobe pleural-based mass with numerous scattered pulmonary nodular   densities. Scattered intimal lytic osseous lesions. Left adrenal mass lesion. Imaging findings consistent with widespread metastatic disease. No evidence of pulmonary embolism. No aortic aneurysm or dissection. Mild centrilobular emphysematous change. No acute process is identified in the chest, abdomen or pelvis. Narrative:      CLINICAL HISTORY: Hemoptysis, chest pain, lung cancer       INDICATION:  Hemoptysis, chest pain and lung cancer   COMPARISON:  None       TECHNIQUE:    Following the uneventful intravenous administration of 100 cc Isovue-370, thin   axial images were obtained through the chest, abdomen and pelvis. Coronal and   sagittal reconstructions were generated. Oral contrast was not administered. CT dose reduction was achieved through use of a standardized protocol tailored   for this examination and automatic exposure control for dose modulation. Adaptive statistical iterative reconstruction (ASIR) was utilized. For the chest portion of the examination only;    3-D MIP reconstructed imaging was obtained. FINDINGS:    VASCULATURE: Supraclavicular adenopathy. Thyroid hypodensities are not   characterized. No aortic aneurysm or dissection. No proximal pulmonary embolism is identified. MEDIASTINUM/HEART: There is extensive mediastinal and hilar lymphadenopathy. Pericardial effusion. Attenuated pulmonary vasculature. PLEURA/LUNGS: Pleural based thickening/soft tissue mass on the rightScattered   groundglass opacities. There is no pleural or pericardial effusion. SOFT TISSUE/ AXILLA: Left axillary lymphadenopathy. Axillary surgical clips on   the right and on the left. LIVER/GALLBLADDER: No mass or biliary dilatation. There is no intrahepatic duct dilatation. The CBD is not dilated. There is no   hepatic parenchymal mass. Hepatic enhancement pattern is within normal limits. The portal vein is patent. SPLEEN/PANCREAS: No mass. . There is no pancreatic mass. There is no pancreatic   duct dilatation. There is no evidence of splenomegaly. ADRENALS/KIDNEYS: Left adrenal mass lesion 10 x 17 mm. Renal cortical thinning   on the right. . There There is no hydroureter or hydronephrosis. There is no   perinephric mass. No ureteral or bladder calculus. STOMACH, COLON AND SMALL BOWEL: Few diverticula. There is no obstruction or free   intraperitoneal air. There is no evidence of incarceration or obstruction. No   mesenteric adenopathy. APPENDIX: Unremarkable. PERITONEUM/RETROPERITONEUM: There is no abdominal aortic aneurysm. No   significant lymphadenopathy. URINARY BLADDER: No mass or calculus. PELVIS: There is no pelvic adenopathy. There is no pelvic sidewall mass. There   is no inguinal adenopathy. BONES: Lytic lesion at T2 and at T1. [employer] No evidence of fracture or dislocation. CXR Results  (Last 48 hours)               12/30/21 0445  XR CHEST PORT Final result    Impression:  Mild lateral hilar prominence. Hilar adenopathy is not excluded. Axillary surgical clips bilaterally. Narrative:  Clinical history: shortness of breath   INDICATION:   shortness of breath   COMPARISON: None       FINDINGS:   AP portable upright view of the chest demonstrates a stable  cardiopericardial   silhouette. There is no pleural effusion. .There is no focal   consolidation. .Prominent angela on the right and on the left. Right axillary and   left axillary surgical clips. . Patient is on a cardiac monitor. Medical Decision Making   I am the first and primary ED physician for this patient's ED visit today. I reviewed our electronic medical record system for any past medical records that may contribute to the patient's current condition, including their past medical history, surgical history, social and family history. This also includes their most recent ED visits, previous hospitalizations and prior diagnostic data.  I have reviewed and summarized the most pertinent findings in my HPI and MDM. Vital Signs Reviewed:  Patient Vitals for the past 24 hrs:   Temp Pulse Resp BP SpO2   12/30/21 1136  (!) 113 25 116/64 96 %   12/30/21 1036  (!) 113 23 114/77 94 %   12/30/21 0936  (!) 113 17 120/64 92 %   12/30/21 0836  (!) 115 23 129/73 96 %   12/30/21 0742     96 %   12/30/21 0740 98.2 °F (36.8 °C) (!) 114 22 126/74 96 %   12/30/21 0635  (!) 115 19 133/60 95 %   12/30/21 0610  (!) 116 16  97 %   12/30/21 0600  (!) 115 19 122/71 93 %   12/30/21 0400  (!) 118 22 (!) 141/75 (!) 89 %   12/30/21 0350  (!) 122 25 134/81 (!) 85 %   12/30/21 0340 98.3 °F (36.8 °C) (!) 121 22 (!) 113/94 98 %     Pulse Oximetry Analysis: 85% on RA    Cardiac Monitor:   Rate: 122 bpm  The cardiac monitor revealed the following rhythm as interpreted by me: sinus tachcyardia  Cardiac monitoring was ordered to monitor patient for signs of cardiac dysrhythmia, which they are at risk for based on their history and/or risk for cardiovascular disease and/or metabolic abnormalities. EKG interpretation:   Billable EKG reviewed by ED Physician in the absence of a cardiologist: Yes  Rhythm: sinus tachycardia; and regular . Rate (approx.): 122; Axis: normal; P wave: normal; QRS interval: normal ; ST/T wave: normal; Other findings: normal. This EKG was interpreted by Cresencio Young MD    Records Reviewed: Nursing Notes, Old Medical Records, Previous electrocardiograms, Previous Radiology Studies and Previous Laboratory Studies, EMS reports    Provider Notes (Medical Decision Making):   Patient presents with acute dyspnea. DDx: asthma, copd, pna, pulmonary edema, acute bronchitis, ACS, ptx, pna. Will obtain EKG, labs, CXR, provide O2 as needed for hypoxia, treat symptomatically and reassess. Will continue to monitor closely in ED. ED Course:   Initial assessment performed.  I discussed presenting problems and concerns, and my formulated plan for today's visit with the patient and any available family members. I have encouraged them to ask questions as they arise throughout the visit. Social History     Tobacco Use    Smoking status: Not on file    Smokeless tobacco: Not on file   Substance Use Topics    Alcohol use: Not on file    Drug use: Not on file       ED Orders Placed:  Orders Placed This Encounter    COVID-19 RAPID TEST    XR CHEST PORT    CTA CHEST W OR W WO CONT    CT ABD PELV W CONT    CBC WITH AUTOMATED DIFF    METABOLIC PANEL, COMPREHENSIVE    TROPONIN-HIGH SENSITIVITY    PTT    PT    SAMPLES BEING HELD    OCCULT BLOOD, STOOL    URINALYSIS W/ REFLEX CULTURE    NT-PRO BNP    BLOOD GAS, ARTERIAL    BLOOD GAS, ARTERIAL    CBC W/O DIFF    METABOLIC PANEL, BASIC    ADULT DIET Easy to [address]    NOTIFY PROVIDER: SPECIFY Notify physician for pulse less than 50 or greater than 120, respiratory rate less than 12 or greater than 25, oral temperature greater than 101.3 F (54.4 C), systolic BP less than 90 or greater than 962, diastolic BP less. ..    ACTIVITY AS TOLERATED W/ASSIST    INTAKE AND OUTPUT    APPLY/MAINTAIN SEQUENTIAL COMPRESSION DEVICE    DO NOT RESUSCITATE    IP CONSULT TO PALLIATIVE CARE - PROVIDER    IP CONSULT TO ONCOLOGY    OXIMETRY, SPOT CHECK    OXYGEN CANNULA Liters per minute: 2; Indications for O2 therapy: HYPOXIA PRN STAT    EKG, 12 LEAD, INITIAL    TYPE & SCREEN    INSERT PERIPHERAL IV ONE TIME STAT    methylPREDNISolone (PF) (Solu-MEDROL) injection 125 mg    iopamidoL (ISOVUE-370) 76 % injection 100 mL    levalbuterol (XOPENEX) nebulizer soln 1.25 mg/3 mL    levETIRAcetam (Keppra) 500 mg tablet    megestroL (MEGACE) 400 mg/10 mL (40 mg/mL) suspension    LORazepam (Ativan) 0.5 mg tablet    HYDROcodone-chlorpheniramine (TUSSIONEX) 10-8 mg/5 mL suspension    prochlorperazine (COMPAZINE) 10 mg tablet    ondansetron hcl (Zofran) 8 mg tablet    loratadine (CLARITIN) 10 mg tablet    guaiFENesin (Mucinex) 1,200 mg Ta12 ER tablet    fluticasone propionate (FLONASE NA)    sodium chloride (NS) flush 5-40 mL    sodium chloride (NS) flush 5-40 mL    OR Linked Order Group     acetaminophen (TYLENOL) tablet 650 mg     acetaminophen (TYLENOL) suppository 650 mg    polyethylene glycol (MIRALAX) packet 17 g    OR Linked Order Group     ondansetron (ZOFRAN ODT) tablet 4 mg     ondansetron (ZOFRAN) injection 4 mg    DISCONTD: enoxaparin (LOVENOX) injection 40 mg    0.9% sodium chloride with KCl 20 mEq/L infusion    potassium bicarb-citric acid (EFFER-K) tablet 40 mEq    DISCONTD: heparin (porcine) injection 5,000 Units    methylPREDNISolone (PF) (SOLU-MEDROL) injection 40 mg    levalbuterol (XOPENEX) nebulizer soln 0.63 mg/3 mL    DISCONTD: flunisolide (NASAREL) 25 mcg/spray nasal spray    guaiFENesin ER (MUCINEX) tablet 1,200 mg    HYDROcodone-chlorpheniramine (TUSSIONEX) oral suspension 5 mL    levETIRAcetam (KEPPRA) tablet 500 mg    cetirizine (ZYRTEC) tablet 10 mg    LORazepam (ATIVAN) tablet 0.5 mg    megestroL (MEGACE) 400 mg/10 mL (10 mL) oral suspension 800 mg    prochlorperazine (COMPAZINE) tablet 10 mg    INITIAL PHYSICIAN ORDER: INPATIENT Telemetry; Yes; 3.  Patient receiving treatment that can only be provided in an inpatient setting (further clarification in H&P documentation)       ED Medications Administered During ED Course:  Medications   sodium chloride (NS) flush 5-40 mL (10 mL IntraVENous Given 12/30/21 0953)   sodium chloride (NS) flush 5-40 mL (has no administration in time range)   acetaminophen (TYLENOL) tablet 650 mg (has no administration in time range)     Or   acetaminophen (TYLENOL) suppository 650 mg (has no administration in time range)   polyethylene glycol (MIRALAX) packet 17 g (has no administration in time range)   ondansetron (ZOFRAN ODT) tablet 4 mg (has no administration in time range)     Or ondansetron (ZOFRAN) injection 4 mg (has no administration in time range)   0.9% sodium chloride with KCl 20 mEq/L infusion ( IntraVENous New Bag 12/30/21 0949)   methylPREDNISolone (PF) (SOLU-MEDROL) injection 40 mg (40 mg IntraVENous Given 12/30/21 0942)   levalbuterol (XOPENEX) nebulizer soln 0.63 mg/3 mL (has no administration in time range)   guaiFENesin ER (MUCINEX) tablet 1,200 mg (1,200 mg Oral Given 12/30/21 0940)   HYDROcodone-chlorpheniramine (TUSSIONEX) oral suspension 5 mL (has no administration in time range)   levETIRAcetam (KEPPRA) tablet 500 mg (500 mg Oral Given 12/30/21 0941)   cetirizine (ZYRTEC) tablet 10 mg (10 mg Oral Given 12/30/21 0940)   LORazepam (ATIVAN) tablet 0.5 mg (has no administration in time range)   megestroL (MEGACE) 400 mg/10 mL (10 mL) oral suspension 800 mg (has no administration in time range)   prochlorperazine (COMPAZINE) tablet 10 mg (has no administration in time range)   methylPREDNISolone (PF) (Solu-MEDROL) injection 125 mg (125 mg IntraVENous Given 12/30/21 0436)   iopamidoL (ISOVUE-370) 76 % injection 100 mL (100 mL IntraVENous Given 12/30/21 0517)   levalbuterol (XOPENEX) nebulizer soln 1.25 mg/3 mL (1.25 mg Nebulization Given 12/30/21 0609)   potassium bicarb-citric acid (EFFER-K) tablet 40 mEq (40 mEq Oral Given 12/30/21 0942)         Progress Note:  Sats 80%; pt placed on 10L NC, still hypoxic so on NRB    Progress Note:  Will give IV steroids given concern for airway edema. ABG being done now. Progress Note:  ABG showing pH 7.43, PCO2 36, PO2 89    Progress Note:  Given tachycardia, will give neb with Levalbuterol to prevent further tachycardia    Progress Note:  CT reviewed:   IMPRESSION  Extensive hilar and mediastinal lymphadenopathy with left axillary adenopathy  and supraclavicular adenopathy as well. Right upper lobe pleural-based mass with numerous scattered pulmonary nodular  densities. Scattered intimal lytic osseous lesions.   Left adrenal mass lesion.     Imaging findings consistent with widespread metastatic disease. No evidence of pulmonary embolism. No aortic aneurysm or dissection. Mild centrilobular emphysematous change. No acute process is identified in the chest, abdomen or pelvis.       Progress Note:  Will continue oxygen, scheduled IV steroids and Xopenex, will need Heme/Onc consultation     Progress Note:    Progress Note:  Pt follows at [address], Dr. Yifan Lassiter, for recurrent stage IV TNBC. Progress Note:  I have just re-evaluated the patient. Patient reports improvement of sx's. I have reviewed Her vital signs and determined there is currently no worsening in their condition or physical exam. Results have been reviewed with them and their questions have been answered. We will continue to review further results as they come available. Consult Note:  Fernando Salgado MD spoke with Dr. Kelly Delarosa  Specialty: Hospitalist  Discussed pt's hx, disposition, and available diagnostic and imaging results. Reviewed care plans. Agree with management and plan thus far. Consultant will evaluate pt. CRITICAL CARE NOTE :  IMPENDING DETERIORATION -Airway, Respiratory, Cardiovascular, Metabolic and Renal  ASSOCIATED RISK FACTORS - Hypotension, Shock, Hypoxia, Bleeding, Dysrhythmia, Metabolic changes, Dehydration and Vascular Compromise  MANAGEMENT- Bedside Assessment  INTERPRETATION -  Xrays, CT Scan, Blood Gases, ECG, Blood Pressure and Cardiac Output Measures   INTERVENTIONS - hemodynamic mngmt, vent mngmt and Metobolic interventions  CASE REVIEW - Hospitalist/Intensivist, Nursing and Family  TREATMENT RESPONSE -Improved  PERFORMED BY - Self    NOTES   :  I personally spent 85 minutes of critical care time with this patient. This is time spent at this critically ill patient's bedside actively involved in patient care as well as the coordination of care and discussions with the patient's family.  This includes time involved in lab review, consultations with specialist, family decision-making, bedside attention and documentation. During this entire length of time I was immediately available to the patient. This does not include time spent on separately reported billable procedures. Critical Care: The reason for providing this level of medical care for this critically-ill patient was due to a critical illness that impaired one or more vital organ systems, such that there was a high probability of imminent or life-threatening deterioration in the patient's condition. This care involved the highest level of preparedness to intervene urgently. This care involved high complexity decision making to assess, manipulate, and support vital system functions, to treat this degree of vital organ system failure, and to prevent further life threatening deterioration of the patients condition requiring frequent assessments and interventions. Juan Andrade MD    ADMIT  Given the patient's current clinical presentation, I have a high level of concern for decompensation if discharged from the emergency department. Patient is being admitted to the hospital.  The results of their tests and reasons for their admission have been discussed with them and/or available family. They convey agreement and understanding for the need to be admitted and for their admission diagnosis. Consultation has been made with the inpatient physician specialist for hospitalization. Disposition:  DISCHARGE  The pt is ready for discharge. The pt's signs, symptoms, diagnosis, and discharge instructions have been discussed and pt has conveyed their understanding. The pt is to follow up as recommended or return to ER should their symptoms worsen. Plan has been discussed and pt is in agreement. Diagnosis   Clinical Impression:  1. Acute respiratory failure with hypoxia ([address]    2. Metastatic breast cancer ([address]    3. Major hemoptysis    4. Acute hypokalemia    5. Respiratory distress    6.  Hoarse voice quality      Attestation:  Isamel Mccauley MD, am the attending of record for this patient. I personally performed the services described in this documentation on this date, 12/30/2021 for patient, Brock Puente. I have reviewed the chart and verified that the record is accurate and complete.

## 2021-12-30 NOTE — ED NOTES
Patient served lunch bag from cafeteria. Pt eat small amount of food. Patient daughter presently at bedside. No new complaints made.

## 2021-12-30 NOTE — PROGRESS NOTES
[address]son [employer] Approved DVT Prophylaxis Dosing      Per P&T approved protocol Enoxaparin has been adjusted to Heparin 5000 unit SCq12h     based on weight and renal function

## 2021-12-30 NOTE — ED NOTES
Bedside and Verbal shift change report given to Alondra (oncoming nurse) by Sagar Hernandez (offgoing nurse). Report included the following information SBAR, Kardex, ED Summary, Intake/Output, MAR, Recent Results and Med Rec Status.

## 2021-12-30 NOTE — ED NOTES
Patient is being transferred to [facility], Room # 8805. Report given to Serg Vinson on [facility] for routine progression of care. Report consisted of the following information SBAR, Kardex, ED Summary, OR Summary, Procedure Summary, Intake/Output, MAR, Accordion, Recent Results, Med Rec Status and Cardiac Rhythm patient status. Patient transferred to receiving unit by: Alondra (RN or tech name). Outstanding consults needed: No     Next labs due: No     The following personal items will be sent with the patient during transfer to the floor:     All valuables:    Cardiac monitoring ordered: Yes    The following CURRENT information was reported to the receiving RN:    Code status: DNR at time of transfer    Last set of vital signs:  Vital Signs  Level of Consciousness: Alert (0) (12/30/21 1136)  Temp: 98.2 °F (36.8 °C) (12/30/21 0740)  Temp Source: Oral (12/30/21 0740)  Pulse (Heart Rate): (!) 112 (12/30/21 1323)  Heart Rate Source: Monitor (12/30/21 1136)  Cardiac Rhythm: Sinus Tachy (12/30/21 1136)  Resp Rate: 25 (12/30/21 1323)  BP: 120/67 (12/30/21 1323)  MAP (Monitor): 80 (12/30/21 1323)  MAP (Calculated): 85 (12/30/21 1323)  BP 1 Location: Left upper arm (12/30/21 1136)  BP 1 Method: Automatic (12/30/21 1136)  BP Patient Position: At rest (12/30/21 1136)  MEWS Score: 4 (12/30/21 0740)         Oxygen Therapy  O2 Sat (%): 95 % (12/30/21 1323)  Pulse via Oximetry: 110 beats per minute (12/30/21 1323)  O2 Device: Nasal cannula (12/30/21 1136)  O2 Flow Rate (L/min): 2 l/min (12/30/21 1136)      Last pain assessment:  Pain 1  Pain Scale 1: Numeric (0 - 10)  Pain Intensity 1: 2  Pain Location 1: Back  Pain Orientation 1: Mid  Pain Description 1: Aching      Wounds: No     Urinary catheter: voiding  Is there a castro order: No     LDAs:       Peripheral IV 12/30/21 Left Antecubital (Active)   Site Assessment Clean, dry, & intact 12/30/21 0350   Phlebitis Assessment 0 12/30/21 0350   Infiltration Assessment 0 12/30/21 0350   Dressing Status Clean, dry, & intact 12/30/21 0350         Opportunity for questions and clarification was provided.     Claudia Mtz RN

## 2021-12-30 NOTE — H&P
Hospitalist Admission Note    NAME: Pam Khalil   :     MRN:  [id number]     Date/Time:  2021 7:44 AM    Patient PCP: Robi Lynn DO  Onc:  Dr. Edra Lesch    Please note that this dictation was completed with [employer], the computer voice recognition software. Quite often unanticipated grammatical, syntax, homophones, and other interpretive errors are inadvertently transcribed by the computer software. Please disregard these errors. Please excuse any errors that have escaped final proofreading  ______________________________________________________________________   Assessment & Plan:  Acute hypoxic respiratory failure with mild hemoptysis due to  Progression of metastatic breast cancer   --CTA chest with RUL pleural base mass with numerous scattered pulmonary nodular densitites and extensive hilar and mediastinal lymphadenopathy. Patient intolerant of oral chemotx for breast cancer and it was halted to re-address next week. --85% RA, cont O2 2-3L. Solumedrol 40mg IV q8h; xopenex neb prn. Mucinex, cough syrup prn  --patient agrees to palliative care consult for symptom management. She had not been ready for hospice previously, but may lean towards this after discussion with oncology. Palliative care consult placed  --oncology consult  --cont keppra for seizure prophylactic prevention due to brain mets. Patient reports s/p XRT to brain in August.    Mild vaginal vs. Rectal bleeding  --monitor. No clear etiology on CT abdomen. Patient defer gynecology evaluation until she has spoken to oncologist.  --there is small amount of blood in urine on UA and urine is light orange but not clear that bleeding is due to hematuria. Chronic voice hoarseness since brain radiation   --denies any dysphagia or sorethroat  --ordered easy to chew diet    Hypokalemia 3.3  --kcl replacement    Anorexia  --cont megace    Body mass index is 23.03 kg/m².     Code: discussed, DNR/DNI  DVT prophylaxis: SCD. No heparin/lovenox due to hemoptysis, vaginal bleeding. Surrogate decision maker:  mpoa daughter Glynda Moritz and alternate is friend/partner Arjun Bojorquez          Subjective:   CHIEF COMPLAINT:  SOB, mild hemoptysis x 2 days, vaginal bleeding today    HISTORY OF PRESENT ILLNESS:     Paul Lennon is a 66 y.o. female   from home via EMS for shortness of breath. Hx of Stage 4 Breast Cx with lymph nodes swollen near wind pipe causing increased work of breathing     Patient states she was having generalized chest tightness prior to coming by ems     Patient states she has coughed up blood past 2 days which is new, also had bloody vaginal discharge x1 which is also new        Had brain radiation ended in 8/2021 and voice chronically hoarse since. Been SOB for about 1 week, progressive but woke up at 2am gasping. No fever, chills. Been coughing up small amount of blood past 2 days. Been told that CT in November showed chest adenopathy progressing despite oral chemo. She was not ready for hospice. She has been on oral chemo but after 3 rounds oncologist has decided to put it on hold as it was making her too sick. Has appointment next week with oncologist.  SOB worsen with any little exertion. No chest pain. We were asked to admit for work up and evaluation of the above problems. No past medical history on file. No past surgical history on file.   Social History     Tobacco Use    Smoking status: Not on file    Smokeless tobacco: Not on file   Substance Use Topics    Alcohol use: Not on file      Drug use:        Family history:  No premature CAD  Allergies   Allergen Reactions    Barley Other (comments)     GI problems    Black Pepper Other (comments)     GI problems    Cinnamon Other (comments)     GI problems    Cottonseed Oil Other (comments)     GI problems    Malt Extract Other (comments)     GI problems    Oats Other (comments)     GI problems    Pineapple Other (comments)     GI problems        Prior to Admission medications    Not on File     REVIEW OF SYSTEMS:  POSITIVE= Bold. Negative = normal text  General:  fever, chills, sweats, generalized weakness, weight loss/gain, loss of appetite  Eyes:  blurred vision, eye pain, loss of vision, diplopia  Ear Nose and Throat:  rhinorrhea, pharyngitis  Respiratory:   cough, sputum production, hemoptysis, SOB, wheezing, [place], pleuritic pain  Cardiology:  chest pain, palpitations, orthopnea, PND, edema, syncope   Gastrointestinal:  abdominal pain, N/V, dysphagia, diarrhea, constipation, bleeding  Genitourinary:  frequency, urgency, dysuria, hematuria, incontinence  Muskuloskeletal :  arthralgia, myalgia  Hematology:  easy bruising, bleeding--vaginal?, lymphadenopathy  Dermatological:  rash, ulceration, pruritis  Endocrine:  hot flashes or polydipsia  Neurological:  headache, dizziness, confusion, focal weakness, paresthesia, memory loss, gait disturbance  Psychological: anxiety, depression, agitation      Objective:   VITALS:    Visit Vitals  /74 (BP 1 Location: Left upper arm, BP Patient Position: At rest)   Pulse (!) 114   Temp 98.2 °F (36.8 °C)   Resp 22   Ht 5' 3\" (1.6 m)   Wt 59 kg (130 lb)   SpO2 96%   BMI 23.03 kg/m²     Temp (24hrs), Av.3 °F (36.8 °C), Min:98.2 °F (36.8 °C), Max:98.3 °F (36.8 °C)    Body mass index is 23.03 kg/m². PHYSICAL EXAM:    General:    Alert, thin female, cooperative, mild purse lip breathing when speaking complete sentences appears stated age. HEENT: Atraumatic, anicteric sclerae, pink conjunctivae     No oral ulcers, mucosa moist, throat clear. Hearing intact. Neck:  Supple, symmetrical,  thyroid: non tender  Lungs:   Coarse rhonchi throughout. No Wheezing. No rales. Chest wall:  No tenderness  No Accessory muscle use. Heart:   Regular  rhythm,  Tachycardic, No  murmur   No gallop. No edema. Abdomen:   Soft, non-tender. Not distended.   Bowel sounds normal. No masses  Extremities: No cyanosis. No clubbing  Skin:     Not pale Not Jaundiced  No rashes   Psych:  Good insight. Not depressed. Not anxious or agitated. Neurologic: EOMs intact. No facial asymmetry. No aphasia or slurred speech. Symmetrical strength, Alert and oriented X 3. IMAGING RESULTS:   []       I have personally reviewed the actual   []     CXR  []     CT scan  CXR:  CTA chest/abd/pelvis:  VASCULATURE: Supraclavicular adenopathy. Thyroid hypodensities are not  characterized. No aortic aneurysm or dissection. No proximal pulmonary embolism is identified. MEDIASTINUM/HEART: There is extensive mediastinal and hilar lymphadenopathy. Pericardial effusion. Attenuated pulmonary vasculature. PLEURA/LUNGS: Pleural based thickening/soft tissue mass on the rightScattered  groundglass opacities. There is no pleural or pericardial effusion. SOFT TISSUE/ AXILLA: Left axillary lymphadenopathy. Axillary surgical clips on  the right and on the left. LIVER/GALLBLADDER: No mass or biliary dilatation. There is no intrahepatic duct dilatation. The CBD is not dilated. There is no  hepatic parenchymal mass. Hepatic enhancement pattern is within normal limits. The portal vein is patent. SPLEEN/PANCREAS: No mass. . There is no pancreatic mass. There is no pancreatic  duct dilatation. There is no evidence of splenomegaly. ADRENALS/KIDNEYS: Left adrenal mass lesion 10 x 17 mm. Renal cortical thinning  on the right. . There There is no hydroureter or hydronephrosis. There is no  perinephric mass. No ureteral or bladder calculus. STOMACH, COLON AND SMALL BOWEL: Few diverticula. There is no obstruction or free  intraperitoneal air. There is no evidence of incarceration or obstruction. No  mesenteric adenopathy. APPENDIX: Unremarkable. PERITONEUM/RETROPERITONEUM: There is no abdominal aortic aneurysm. No  significant lymphadenopathy. URINARY BLADDER: No mass or calculus. PELVIS: There is no pelvic adenopathy. There is no pelvic sidewall mass. There  is no inguinal adenopathy. BONES: Lytic lesion at T2 and at T1. Yoly Rast No evidence of fracture or dislocation.     IMPRESSION  Extensive hilar and mediastinal lymphadenopathy with left axillary adenopathy  and supraclavicular adenopathy as well. Right upper lobe pleural-based mass with numerous scattered pulmonary nodular  densities. Scattered intimal lytic osseous lesions. Left adrenal mass lesion.     Imaging findings consistent with widespread metastatic disease. No evidence of pulmonary embolism. No aortic aneurysm or dissection. Mild centrilobular emphysematous change. No acute process is identified in the chest, abdomen or pelvis. EKG: sinus tach 122, anterior Q waves, LAE   ________________________________________________________________________  Care Plan discussed with:    Comments   Patient y    Family  y Dorothye Null bedside   RN     Care Manager                    Consultant:      ________________________________________________________________________  Prophylaxis:  GI none   DVT SCD   ________________________________________________________________________  Recommended Disposition: home with hospice  Home with Family    HH/PT/OT/RN    SNF/LTC    JON    ________________________________________________________________________  Code Status:  Full Code    DNR/DNI y   ________________________________________________________________________  TOTAL TIME:  50 minutes    ______________________________________________________________________  Ynug Lax, MD      Procedures: see electronic medical records for all procedures/Xrays and details which were not copied into this note but were reviewed prior to creation of Plan.     LAB DATA REVIEWED:    Recent Results (from the past 24 hour(s))   CBC WITH AUTOMATED DIFF    Collection Time: 12/30/21  3:42 AM   Result Value Ref Range    WBC 10.8 3.6 - 11.0 K/uL    RBC 4.35 3.80 - 5.20 M/uL    HGB 14.0 11.5 - 16.0 g/dL HCT 40.9 35.0 - 47.0 %    MCV 94.0 80.0 - 99.0 FL    MCH 32.2 26.0 - 34.0 PG    MCHC 34.2 30.0 - 36.5 g/dL    RDW 14.5 11.5 - 14.5 %    PLATELET 326 271 - 420 K/uL    MPV 8.6 (L) 8.9 - 12.9 FL    NRBC 0.0 0  WBC    ABSOLUTE NRBC 0.00 0.00 - 0.01 K/uL    NEUTROPHILS 81 (H) 32 - 75 %    LYMPHOCYTES 10 (L) 12 - 49 %    MONOCYTES 8 5 - 13 %    EOSINOPHILS 1 0 - 7 %    BASOPHILS 0 0 - 1 %    IMMATURE GRANULOCYTES 0 0.0 - 0.5 %    ABS. NEUTROPHILS 8.7 (H) 1.8 - 8.0 K/UL    ABS. LYMPHOCYTES 1.0 0.8 - 3.5 K/UL    ABS. MONOCYTES 0.9 0.0 - 1.0 K/UL    ABS. EOSINOPHILS 0.1 0.0 - 0.4 K/UL    ABS. BASOPHILS 0.0 0.0 - 0.1 K/UL    ABS. IMM. GRANS. 0.0 0.00 - 0.04 K/UL    DF AUTOMATED     METABOLIC PANEL, COMPREHENSIVE    Collection Time: 12/30/21  3:42 AM   Result Value Ref Range    Sodium 136 136 - 145 mmol/L    Potassium 3.3 (L) 3.5 - 5.1 mmol/L    Chloride 103 97 - 108 mmol/L    CO2 27 21 - 32 mmol/L    Anion gap 6 5 - 15 mmol/L    Glucose 132 (H) 65 - 100 mg/dL    BUN 8 6 - 20 MG/DL    Creatinine 0.68 0.55 - 1.02 MG/DL    BUN/Creatinine ratio 12 12 - 20      GFR est AA >60 >60 ml/min/1.73m2    GFR est non-AA >60 >60 ml/min/1.73m2    Calcium 9.0 8.5 - 10.1 MG/DL    Bilirubin, total 1.4 (H) 0.2 - 1.0 MG/DL    ALT (SGPT) 30 12 - 78 U/L    AST (SGOT) 38 (H) 15 - 37 U/L    Alk.  phosphatase 139 (H) 45 - 117 U/L    Protein, total 7.4 6.4 - 8.2 g/dL    Albumin 2.8 (L) 3.5 - 5.0 g/dL    Globulin 4.6 (H) 2.0 - 4.0 g/dL    A-G Ratio 0.6 (L) 1.1 - 2.2     TROPONIN-HIGH SENSITIVITY    Collection Time: 12/30/21  3:42 AM   Result Value Ref Range    Troponin-High Sensitivity 25 0 - 51 ng/L   NT-PRO BNP    Collection Time: 12/30/21  3:42 AM   Result Value Ref Range    NT pro-BNP 99 <125 PG/ML   EKG, 12 LEAD, INITIAL    Collection Time: 12/30/21  3:45 AM   Result Value Ref Range    Ventricular Rate 122 BPM    Atrial Rate 122 BPM    P-R Interval 158 ms    QRS Duration 62 ms    Q-T Interval 328 ms    QTC Calculation (Bezet) 467 ms Calculated P Axis 86 degrees    Calculated R Axis 29 degrees    Calculated T Axis 51 degrees    Diagnosis       Sinus tachycardia  Possible Left atrial enlargement  Possible Anterior infarct , age undetermined  No previous ECGs available     PTT    Collection Time: 12/30/21  3:55 AM   Result Value Ref Range    aPTT 27.2 22.1 - 31.0 sec    aPTT, therapeutic range     58.0 - 77.0 SECS   PROTHROMBIN TIME + INR    Collection Time: 12/30/21  3:55 AM   Result Value Ref Range    INR 1.1 0.9 - 1.1      Prothrombin time 11.4 (H) 9.0 - 11.1 sec   TYPE & SCREEN    Collection Time: 12/30/21  3:55 AM   Result Value Ref Range    Crossmatch Expiration 01/02/2022,2359     ABO/Rh(D) O NEGATIVE     Antibody screen NEG    SAMPLES BEING HELD    Collection Time: 12/30/21  3:55 AM   Result Value Ref Range    SAMPLES BEING HELD BL     COMMENT        Add-on orders for these samples will be processed based on acceptable specimen integrity and analyte stability, which may vary by analyte.    COVID-19 RAPID TEST    Collection Time: 12/30/21  4:27 AM   Result Value Ref Range    Specimen source Nasopharyngeal      COVID-19 rapid test Not detected NOTD     URINALYSIS W/ REFLEX CULTURE    Collection Time: 12/30/21  5:03 AM    Specimen: Urine   Result Value Ref Range    Color YELLOW/STRAW      Appearance CLEAR CLEAR      Specific gravity 1.006 1.003 - 1.030      pH (UA) 6.5 5.0 - 8.0      Protein TRACE (A) NEG mg/dL    Glucose Negative NEG mg/dL    Ketone TRACE (A) NEG mg/dL    Bilirubin Negative NEG      Blood LARGE (A) NEG      Urobilinogen 1.0 0.2 - 1.0 EU/dL    Nitrites Negative NEG      Leukocyte Esterase TRACE (A) NEG      WBC 0-4 0 - 4 /hpf    RBC >100 (H) 0 - 5 /hpf    Epithelial cells FEW FEW /lpf    Bacteria Negative NEG /hpf    UA:UC IF INDICATED CULTURE NOT INDICATED BY UA RESULT CNI      Hyaline cast 0-2 0 - 5 /lpf   BLOOD GAS, ARTERIAL    Collection Time: 12/30/21  6:04 AM   Result Value Ref Range    pH 7.43 7.35 - 7.45      PCO2 36 35 - 45 mmHg    PO2 89 80 - 100 mmHg    O2 SAT 97 92 - 97 %    BICARBONATE 23 22 - 26 mmol/L    BASE DEFICIT 0.5 mmol/L    O2 METHOD NASAL CANNULA      O2 FLOW RATE 2.00 L/min    Sample source ARTERIAL      SITE LEFT RADIAL      BREE'S TEST YES

## 2021-12-30 NOTE — CONSULTS
Hematology Oncology Consultation    Reason for consult: Breast Cancer    Admitting Diagnosis: Acute respiratory failure with hypoxia ([facility] [address] [J96.01]  Metastatic breast cancer ([facility] [address] [C50.919]  Hypokalemia [E87.6]    Impression:   *) Stage IV , TNBC:  - follows with my partner, Dr. Greta Collado for recurrent stage IV TNBC. Has known brain mets as well. Has been off treatment since mid November 2021 due to intolerance. Has been following with palliative care as an outpt, Tahir Sheridan NP. She came in with worsening SOB and hypoxia along with hemoptysis. - Discussed in depth with patient and daughter and she is agreeable to hospice. She is currently living alone with a boyfriend living in separate condo above hers. Discussed she shouldn't be alone given her declining ecog, she is going to move in with her daughter.  - Will place consult for hospice    *) Back pain due to mets:  - has been on xgeva given spinal disease  - add oxycdodne/apap 5mg/325mg prn pain    *) Brain mets:  - s/p xrt, cont keppra for seizure prophylaxis    *) Cough/hemoptysis:  - continue Tussinex prn, on solumedrol, xopenex nebs helping with cough    *) nausea:  - ativan, zofran prn    *) Wt loss:  - on megace 800mg liquid daily    Discussed with Dr. Faby Sanchez and let her primary Oncologist know patient's status as well. Thank you for this kind referral, we will follow along with you. Discussion: Pinky Felty is a  66y.o. year old seen in consultation at the request of Dr. Faby Sanchez for breast cancer. She is following with my partner, Dr. Greta Collado for her triple negative breast cancer that is stage IV. She has been treated with palliative brain xrt, most recently was on Xeloda until mid November when it was abandoned due to intolerance. She has been having worsening cough and hemoptysis last few days prompting her to seek medical care in ER today.   [place] [address] ED she was hypoxic to mid 80's on RA and required 2LNC, sating in mid 90's on 2L currently, she is still coughing, feels xopenex nebs are helping some with her cough. Denies fever, chills, she is very weak and tired, daughter at her bedside as well. She is starting to experience back pain which is new as well. Imaging:        Labs:    Recent Results (from the past 24 hour(s))   CBC WITH AUTOMATED DIFF    Collection Time: 12/30/21  3:42 AM   Result Value Ref Range    WBC 10.8 3.6 - 11.0 K/uL    RBC 4.35 3.80 - 5.20 M/uL    HGB 14.0 11.5 - 16.0 g/dL    HCT 40.9 35.0 - 47.0 %    MCV 94.0 80.0 - 99.0 FL    MCH 32.2 26.0 - 34.0 PG    MCHC 34.2 30.0 - 36.5 g/dL    RDW 14.5 11.5 - 14.5 %    PLATELET 021 980 - 413 K/uL    MPV 8.6 (L) 8.9 - 12.9 FL    NRBC 0.0 0  WBC    ABSOLUTE NRBC 0.00 0.00 - 0.01 K/uL    NEUTROPHILS 81 (H) 32 - 75 %    LYMPHOCYTES 10 (L) 12 - 49 %    MONOCYTES 8 5 - 13 %    EOSINOPHILS 1 0 - 7 %    BASOPHILS 0 0 - 1 %    IMMATURE GRANULOCYTES 0 0.0 - 0.5 %    ABS. NEUTROPHILS 8.7 (H) 1.8 - 8.0 K/UL    ABS. LYMPHOCYTES 1.0 0.8 - 3.5 K/UL    ABS. MONOCYTES 0.9 0.0 - 1.0 K/UL    ABS. EOSINOPHILS 0.1 0.0 - 0.4 K/UL    ABS. BASOPHILS 0.0 0.0 - 0.1 K/UL    ABS. IMM. GRANS. 0.0 0.00 - 0.04 K/UL    DF AUTOMATED     METABOLIC PANEL, COMPREHENSIVE    Collection Time: 12/30/21  3:42 AM   Result Value Ref Range    Sodium 136 136 - 145 mmol/L    Potassium 3.3 (L) 3.5 - 5.1 mmol/L    Chloride 103 97 - 108 mmol/L    CO2 27 21 - 32 mmol/L    Anion gap 6 5 - 15 mmol/L    Glucose 132 (H) 65 - 100 mg/dL    BUN 8 6 - 20 MG/DL    Creatinine 0.68 0.55 - 1.02 MG/DL    BUN/Creatinine ratio 12 12 - 20      GFR est AA >60 >60 ml/min/1.73m2    GFR est non-AA >60 >60 ml/min/1.73m2    Calcium 9.0 8.5 - 10.1 MG/DL    Bilirubin, total 1.4 (H) 0.2 - 1.0 MG/DL    ALT (SGPT) 30 12 - 78 U/L    AST (SGOT) 38 (H) 15 - 37 U/L    Alk.  phosphatase 139 (H) 45 - 117 U/L    Protein, total 7.4 6.4 - 8.2 g/dL    Albumin 2.8 (L) 3.5 - 5.0 g/dL    Globulin 4.6 (H) 2.0 - 4.0 g/dL    A-G Ratio 0.6 (L) 1.1 - 2.2     TROPONIN-HIGH SENSITIVITY    Collection Time: 12/30/21  3:42 AM   Result Value Ref Range    Troponin-High Sensitivity 25 0 - 51 ng/L   NT-PRO BNP    Collection Time: 12/30/21  3:42 AM   Result Value Ref Range    NT pro-BNP 99 <125 PG/ML   EKG, 12 LEAD, INITIAL    Collection Time: 12/30/21  3:45 AM   Result Value Ref Range    Ventricular Rate 122 BPM    Atrial Rate 122 BPM    P-R Interval 158 ms    QRS Duration 62 ms    Q-T Interval 328 ms    QTC Calculation (Bezet) 467 ms    Calculated P Axis 86 degrees    Calculated R Axis 29 degrees    Calculated T Axis 51 degrees    Diagnosis       Sinus tachycardia  Possible Left atrial enlargement  Possible Anterior infarct , age undetermined  No previous ECGs available     PTT    Collection Time: 12/30/21  3:55 AM   Result Value Ref Range    aPTT 27.2 22.1 - 31.0 sec    aPTT, therapeutic range     58.0 - 77.0 SECS   PROTHROMBIN TIME + INR    Collection Time: 12/30/21  3:55 AM   Result Value Ref Range    INR 1.1 0.9 - 1.1      Prothrombin time 11.4 (H) 9.0 - 11.1 sec   TYPE & SCREEN    Collection Time: 12/30/21  3:55 AM   Result Value Ref Range    Crossmatch Expiration 01/02/2022,2359     ABO/Rh(D) O NEGATIVE     Antibody screen NEG    SAMPLES BEING HELD    Collection Time: 12/30/21  3:55 AM   Result Value Ref Range    SAMPLES BEING HELD BL     COMMENT        Add-on orders for these samples will be processed based on acceptable specimen integrity and analyte stability, which may vary by analyte.    COVID-19 RAPID TEST    Collection Time: 12/30/21  4:27 AM   Result Value Ref Range    Specimen source Nasopharyngeal      COVID-19 rapid test Not detected NOTD     URINALYSIS W/ REFLEX CULTURE    Collection Time: 12/30/21  5:03 AM    Specimen: Urine   Result Value Ref Range    Color YELLOW/STRAW      Appearance CLEAR CLEAR      Specific gravity 1.006 1.003 - 1.030      pH (UA) 6.5 5.0 - 8.0      Protein TRACE (A) NEG mg/dL    Glucose Negative NEG mg/dL    Ketone TRACE (A) NEG mg/dL    Bilirubin Negative NEG      Blood LARGE (A) NEG      Urobilinogen 1.0 0.2 - 1.0 EU/dL    Nitrites Negative NEG      Leukocyte Esterase TRACE (A) NEG      WBC 0-4 0 - 4 /hpf    RBC >100 (H) 0 - 5 /hpf    Epithelial cells FEW FEW /lpf    Bacteria Negative NEG /hpf    UA:UC IF INDICATED CULTURE NOT INDICATED BY UA RESULT CNI      Hyaline cast 0-2 0 - 5 /lpf   BLOOD GAS, ARTERIAL    Collection Time: 12/30/21  6:04 AM   Result Value Ref Range    pH 7.43 7.35 - 7.45      PCO2 36 35 - 45 mmHg    PO2 89 80 - 100 mmHg    O2 SAT 97 92 - 97 %    BICARBONATE 23 22 - 26 mmol/L    BASE DEFICIT 0.5 mmol/L    O2 METHOD NASAL CANNULA      O2 FLOW RATE 2.00 L/min    Sample source ARTERIAL      SITE LEFT RADIAL      BREE'S TEST YES         History:  No past medical history on file. No past surgical history on file. Prior to Admission medications    Medication Sig Start Date End Date Taking? Authorizing Provider   levETIRAcetam (Keppra) 500 mg tablet Take 500 mg by mouth two (2) times a day. Yes Provider, Historical   megestroL (MEGACE) 400 mg/10 mL (40 mg/mL) suspension Take 800 mg by mouth daily. Yes Provider, Historical   LORazepam (Ativan) 0.5 mg tablet Take 0.5 mg by mouth every six (6) hours as needed for Anxiety. Yes Provider, Historical   HYDROcodone-chlorpheniramine (TUSSIONEX) 10-8 mg/5 mL suspension Take 5 mL by mouth every twelve (12) hours as needed for Cough. Yes Provider, Historical   prochlorperazine (COMPAZINE) 10 mg tablet Take 10 mg by mouth every six (6) hours as needed for Nausea or Vomiting. Yes Provider, Historical   ondansetron hcl (Zofran) 8 mg tablet Take 8 mg by mouth every eight (8) hours as needed for Nausea or Vomiting. Yes Provider, Historical   loratadine (CLARITIN) 10 mg tablet Take 10 mg by mouth daily. Yes Provider, Historical   guaiFENesin (Mucinex) 1,200 mg Ta12 ER tablet Take 1,200 mg by mouth two (2) times a day.    Yes Provider, Historical   fluticasone propionate (FLONASE NA) 1 Spray by Nasal route daily. Yes Provider, Historical     Allergies   Allergen Reactions    Barley Other (comments)     GI problems    Black Pepper Other (comments)     GI problems    Cinnamon Other (comments)     GI problems    Cottonseed Oil Other (comments)     GI problems    Malt Extract Other (comments)     GI problems    Oats Other (comments)     GI problems    Pineapple Other (comments)     GI problems      Social History     Tobacco Use    Smoking status: Not on file    Smokeless tobacco: Not on file   Substance Use Topics    Alcohol use: Not on file      No family history on file.      Current Medications:  Current Facility-Administered Medications   Medication Dose Route Frequency    sodium chloride (NS) flush 5-40 mL  5-40 mL IntraVENous Q8H    sodium chloride (NS) flush 5-40 mL  5-40 mL IntraVENous PRN    acetaminophen (TYLENOL) tablet 650 mg  650 mg Oral Q6H PRN    Or    acetaminophen (TYLENOL) suppository 650 mg  650 mg Rectal Q6H PRN    polyethylene glycol (MIRALAX) packet 17 g  17 g Oral DAILY PRN    ondansetron (ZOFRAN ODT) tablet 4 mg  4 mg Oral Q8H PRN    Or    ondansetron (ZOFRAN) injection 4 mg  4 mg IntraVENous Q6H PRN    0.9% sodium chloride with KCl 20 mEq/L infusion   IntraVENous CONTINUOUS    methylPREDNISolone (PF) (SOLU-MEDROL) injection 40 mg  40 mg IntraVENous Q8H    levalbuterol (XOPENEX) nebulizer soln 0.63 mg/3 mL  0.63 mg Nebulization Q4H PRN    guaiFENesin ER (MUCINEX) tablet 1,200 mg  1,200 mg Oral BID    HYDROcodone-chlorpheniramine (TUSSIONEX) oral suspension 5 mL  5 mL Oral Q12H PRN    levETIRAcetam (KEPPRA) tablet 500 mg  500 mg Oral BID    cetirizine (ZYRTEC) tablet 10 mg  10 mg Oral DAILY    LORazepam (ATIVAN) tablet 0.5 mg  0.5 mg Oral Q6H PRN    megestroL (MEGACE) 400 mg/10 mL (10 mL) oral suspension 800 mg  800 mg Oral DAILY    prochlorperazine (COMPAZINE) tablet 10 mg  10 mg Oral Q6H PRN    oxyCODONE-acetaminophen (PERCOCET) 5-325 mg per tablet 1 Tablet  1 Tablet Oral Q4H PRN     Current Outpatient Medications   Medication Sig    levETIRAcetam (Keppra) 500 mg tablet Take 500 mg by mouth two (2) times a day.  megestroL (MEGACE) 400 mg/10 mL (40 mg/mL) suspension Take 800 mg by mouth daily.  LORazepam (Ativan) 0.5 mg tablet Take 0.5 mg by mouth every six (6) hours as needed for Anxiety.  HYDROcodone-chlorpheniramine (TUSSIONEX) 10-8 mg/5 mL suspension Take 5 mL by mouth every twelve (12) hours as needed for Cough.  prochlorperazine (COMPAZINE) 10 mg tablet Take 10 mg by mouth every six (6) hours as needed for Nausea or Vomiting.  ondansetron hcl (Zofran) 8 mg tablet Take 8 mg by mouth every eight (8) hours as needed for Nausea or Vomiting.  loratadine (CLARITIN) 10 mg tablet Take 10 mg by mouth daily.  guaiFENesin (Mucinex) 1,200 mg Ta12 ER tablet Take 1,200 mg by mouth two (2) times a day.  fluticasone propionate (FLONASE NA) 1 Spray by Nasal route daily. Review of Systems:  Constitutional No fevers, chills, night sweats,++ excessive fatigue ++ weight loss. Allergic/Immunologic No recent allergic reactions   Eyes No significant visual difficulties. No diplopia. ENMT No problems with hearing, no sore throat, no sinus drainage. Endocrine No hot flashes or night sweats. No cold intolerance, polyuria, or polydipsia   Hematologic/Lymphatic No easy bruising or bleeding. The patient denies any tender or palpable lymph nodes   Breasts No abnormal masses of breast, nipple discharge or pain. Respiratory No dyspnea on exertion, orthopnea, chest pain,+ cough++hemoptysis. Cardiovascular No anginal chest pain, irregular heart beat, tachycardia, palpitations or orthopnea. Gastrointestinal No nausea, vomiting, diarrhea, constipation, cramping, dysphagia, reflux, heartburn, GI bleeding, or early satiety. No change in bowel habits. Genitourinary (M) No hematuria, dysuria, increased frequency, urgency, hesitancy or incontinence. Musculoskeletal No joint pain, swelling or redness. No decreased range of motion. Integumentary No chronic rashes, inflammation, ulcerations, pruritus, petechiae, purpura, ecchymoses, or skin changes. Neurologic No headache, blurred vision, and no areas of focal weakness or numbness. Normal gait. No sensory problems. Psychiatric No insomnia, depression, taqueria or mood swings. No psychotropic drugs. Physical Exam:  Constitutional Alert, cooperative, oriented. Thin and frail appearing   Head Normocephalic; no scars   Eyes Conjunctivae and sclerae are clear and without icterus. Pupils are reactive and equal.   ENMT Sinuses are nontender. No oral exudates, ulcers, masses, thrush or mucositis. Oropharynx clear. Tongue normal.   Neck Supple without masses or thyromegaly. No jugular venous distension. Hematologic/Lymphatic No petechiae or purpura. No tender or palpable lymph nodes in the cervical, supraclavicular, axillary or inguinal area. Respiratory Rhonchi in b/l lung fields worse in LLL   Cardiovascular Regular rate and rhythm of heart without murmurs, gallops or rubs. Chest / Line Site Chest is symmetric with no chest wall deformities. Abdomen Non-tender, non-distended, no masses, ascites or hepatosplenomegaly. Good bowel sounds. No guarding or rebound tenderness. No pulsatile masses. Musculoskeletal No tenderness or swelling, normal range of motion without obvious weakness. Extremities No visible deformities, no cyanosis, clubbing or edema. Skin No rashes, scars, or lesions suggestive of malignancy. No petechiae, purpura, or ecchymoses. No excoriations. Neurologic No sensory or motor deficits, normal cerebellar function, normal gait, cranial nerves intact. Psychiatric Alert and oriented times three. Coherent speech. Verbalizes understanding of our discussions today.

## 2021-12-30 NOTE — PROGRESS NOTES
Transition of Care Plan:    RUR: 9% low risk for readmission   Disposition: Home with daughter, Gema Gonzalez to [address] [facility] [address] [facility]  > Hospice consult in place, possibly home with hospice  Follow up appointments: Pending hospital course  DME needed: Pt has a walker (at bedside), cane, shower seat. Pt currently on oxygen. Transportation at Discharge: Pt's daughter or friend, Loeduarde Cobble or means to access home: Pt will have access to daughter's home   IM Medicare Letter: Will need 2nd [facility] letter prior to d/c  Is patient a BCPI-A Bundle: N/A          If yes, was Bundle Letter given?:    Is patient a  and connected with the [place]? N/A  If yes, was [place] transfer form completed and [facility] notified? Caregiver Contact: Pt's daughter/Edison Amezcua Proffer p) [phone]  Discharge Caregiver contacted prior to discharge? ED CM met with pt and pt's daughter at bedside today. Unit CM to follow.                Reason for Admission:  Acute hypoxic respiratory failure with mild hemoptysis due to progression of metastatic breast cancer                     RUR Score:  9% low risk for readmission                     Plan for utilizing home health: Pending hospital course         PCP: First and Last name:  Jorge Cruz DO     Name of Practice: [facility]   Are you a current patient: Yes/No: Yes   Approximate date of last visit:    Can you participate in a virtual visit with your PCP:                     Current Advanced Directive/Advance Care Plan: DNR   Advance Care Planning     General Advance Care Planning (ACP) Conversation      Date of Conversation: 12/30/2021  Conducted with: Patient with Decision Making Capacity    Healthcare Decision Maker:     Primary Decision Maker: Patsy Riedel - Daughter - [phone]  Click here to complete [address] including selection of the Healthcare Decision Maker Relationship (ie \"Primary\")      Today we documented Decision Maker(s) consistent with ACP documents on file. Content/Action Overview: Has ACP document(s) on file - reflects the patient's care preferences  Reviewed DNR/DNI and patient confirms current DNR status - completed forms on file (place new order if needed)    Additional Comments: Obtained copy of Advance Directive and Living Will on today. Both placed on bedside chart to be uploaded into electronic medical chart. Length of Voluntary ACP Conversation in minutes:  <16 minutes (Non-Billable)    [address] Decision Maker:     Primary Decision Maker: Paola Huang - Daughter - [phone]                  Transition of Care Plan:   Discharge to daughter's home, possibly with home hospice, consult in place. CM reviewed chart. CM completed assessment with pt and pt's daughter/mPOJOHNSON Chloe, at bedside. CM introduced self/role, verified demographics, and discussed discharge planning. Pt resides alone currently, ambulates with walker, reports having increased difficulty and shortness of breath with activity. Pt currently on home oxygen. Pt follows with oncologist Dr. Indio Barreto. Pt seen by oncology today, pt in agreement with hospice consult for hospice discussion. CM discussed this with pt and pt's daughter who remain in agreement to consider hospice. CM explained that referral to be placed to [facility] for information session, reviewed that pt has freedom of choice to choose any agency that is preferred for hospice care if they elect for this at d/c. CM provided hospice agency list as well as private duty caregiver list today to pt's daughter. Daughter voices that pt has LTC insurance that they may use to obtain caregiver support. Pharmacy preference is [facility]. Pt to discharge home with daughter to daughter's [address] at [address] [address], [address] [facility]. Daughter or friend, Amado Caal, to transport at d/c.     Unit care management will continue to follow for transition of care planning needs. Care Management Interventions  PCP Verified by CM: Yes  Palliative Care Criteria Met (RRAT>21 & CHF Dx)?:  (Palliative consult in place)  Palliative Consult Recommended?: Yes  Mode of Transport at Discharge: Other (see comment) (Pt's daughter or friend, Danny Chadwick)  Transition of Care Consult (CM Consult): Discharge Planning  Discharge Durable Medical Equipment: No (Pt has a walker (at bedside), cane, shower seat, and grab bars in bathroom)  Physical Therapy Consult: No  Occupational Therapy Consult: No  Speech Therapy Consult: No  Support Systems: Child(priya),Friend/Neighbor (Daughter, friend Danny Chadwick)  Confirm Follow Up Transport: Family  Discharge Location  Discharge Placement: Home with family assistance (D/c to daughter Chloe's home, possibly with home hospice.  Hospice consult in place)    Estrella Moore [facility] [address], [address]

## 2021-12-30 NOTE — HOSPICE
[address] [facility] Help to Those in Need  [phone]    Patient Name: Alejandro Gomes  YOB: 1950  Age: 66 y.[facility]. [facility]:  Hospice consult noted. Chart reviewed. Plan of care discussed with patients nurse & care manager. In to meet with patient and daughter, Marlena De León. Discussed Hospice philosophy, general plan of care, levels of care, services and on call procedures. Family information packet provided & reviewed. We had a long d/w about the hospice benefit and what medicare covers. Pt was receiving brain XRT [id number] and is due for another round next Wednesday. Pt and dtr would like to discuss the benefit vs risk of continuing radiation therapy with Dr. jV Vazquez before agreeing to hospice as these procedures will not be covered under hospice care. At time of discharge the plan is for pt to go live with her daughter at [address] [place] [address], [address] [facility] which [address] is able to service if patient is agreeable to hospice. Thank you for the opportunity to be of service to this patient.

## 2021-12-31 NOTE — PROGRESS NOTES
Hospitalist Progress Note    NAME: Sidra Tellez   :  1950   MRN:  [id number]       Assessment / Plan:    Acute hypoxic respiratory failure with mild hemoptysis due to  Progression of metastatic breast cancer   --CTA chest with RUL pleural base mass with numerous scattered pulmonary nodular densitites and extensive hilar and mediastinal lymphadenopathy. Patient intolerant of oral chemotx for breast cancer and it was halted to re-address next week. -- currently on 2 to 3 L nasal cannula. Change steroid to prednisone 20 mg daily for 7 days. --Palliative care on boardrecommended hospice  Hospice on boardpatient and family agrees for the home with hospice  --oncology consult  --cont keppra for seizure prophylactic prevention due to brain mets. Patient reports s/p XRT to brain in August.     Mild vaginal vs. Rectal bleeding  --monitor. No clear etiology on CT abdomen. Patient defer gynecology evaluation until she has spoken to oncologist.  --there is small amount of blood in urine on UA and urine is light orange but not clear that bleeding is due to hematuria.     Chronic voice hoarseness since brain radiation   --denies any dysphagia or sorethroat  --ordered easy to chew diet     Hypokalemia 3.3  --kcl replacement     Anorexia  --cont megace    18.5 - 24.9 Normal weight / Body mass index is 23.03 kg/m². Estimated discharge date:   Barriers: Hospice set up    Code status: DNR  Prophylaxis: Hep SQ  Recommended Disposition: Home with hospice     Subjective:     Chief Complaint / Reason for Physician Visit  Patient seen today, discussed in detail about the prognosis and diagnosis with the daughter present in the room. Discussed with RN events overnight.      Review of Systems:  Symptom Y/N Comments  Symptom Y/N Comments   Fever/Chills    Chest Pain     Poor Appetite    Edema     Cough    Abdominal Pain     Sputum    Joint Pain     SOB/STOCKTON    Pruritis/Rash     Nausea/vomit Tolerating PT/OT     Diarrhea    Tolerating Diet     Constipation    Other       Could NOT obtain due to:      Objective:     VITALS:   Last 24hrs VS reviewed since prior progress note. Most recent are:  Patient Vitals for the past 24 hrs:   Temp Pulse Resp BP SpO2   12/31/21 1109     94 %   12/31/21 0815 97.7 °F (36.5 °C) (!) 121 20 (!) 141/84 97 %   12/31/21 0300 97.6 °F (36.4 °C) (!) 113 21 (!) 115/58 95 %   12/30/21 1959 97.7 °F (36.5 °C) (!) 109 20 127/66 97 %   12/30/21 1801 97.9 °F (36.6 °C) (!) 116 20 133/82 96 %   12/30/21 1323  (!) 112 25 120/67 95 %     No intake or output data in the 24 hours ending 12/31/21 1321     I had a face to face encounter and independently examined this patient on 12/31/2021, as outlined below:  PHYSICAL EXAM:  General: WD, WN. Alert, cooperative, no acute distress    EENT:  EOMI. Anicteric sclerae. MMM  Resp:  CTA bilaterally, no wheezing or rales. No accessory muscle use  CV:  Regular  rhythm,  No edema  GI:  Soft, Non distended, Non tender. +Bowel sounds  Neurologic:  Alert and oriented X 3, normal speech,   Psych:   Good insight. Not anxious nor agitated  Skin:  No rashes. No jaundice    Reviewed most current lab test results and cultures  YES  Reviewed most current radiology test results   YES  Review and summation of old records today    NO  Reviewed patient's current orders and MAR    YES  PMH/SH reviewed - no change compared to H&P  ________________________________________________________________________  Care Plan discussed with:    Comments   Patient x    Family  x daughter   RN x    Care Manager     Consultant                       x Multidiciplinary team rounds were held today with , nursing, pharmacist and clinical coordinator. Patient's plan of care was discussed; medications were reviewed and discharge planning was addressed.      ________________________________________________________________________  Total NON critical care TIME:  37 Minutes    Total CRITICAL CARE TIME Spent:   Minutes non procedure based      Comments   >50% of visit spent in counseling and coordination of care     ________________________________________________________________________  Yina Lopez MD     Procedures: see electronic medical records for all procedures/Xrays and details which were not copied into this note but were reviewed prior to creation of Plan. LABS:  I reviewed today's most current labs and imaging studies.   Pertinent labs include:  Recent Labs     12/31/21 0308 12/30/21 0342   WBC 13.8* 10.8   HGB 12.8 14.0   HCT 38.2 40.9    397     Recent Labs     12/31/21 0308 12/30/21  0355 12/30/21 0342     --  136   K 4.6  --  3.3*   *  --  103   CO2 21  --  27   *  --  132*   BUN 8  --  8   CREA 0.48*  --  0.68   CA 8.4*  --  9.0   ALB  --   --  2.8*   TBILI  --   --  1.4*   ALT  --   --  30   INR  --  1.1  --        Signed: Yina Lopez MD

## 2021-12-31 NOTE — PROGRESS NOTES
Bedside shift change report given to  Reji Mckeon (oncoming nurse) by  Jerad Ortega RN (offgoing nurse). Report included the following information SBAR, ED Summary, Recent Results      Shift worked: 3p to 7p   Shift summary and any significant changes:     Leaving to home tomorrow w/ hospice, 10Am    Concerns for physician to address:    Zone phone for oncoming shift:        Patient Information  Carlene Hearn  66 y.o.  12/30/2021  3:32 AM by Kelly Flores MD. Carlene Hearn was admitted from Home       No past medical history on file.     Core Measures:  CVA: No No  CHF:No No  PNA:No No     Activity:  Activity Level: Up with Assistance  Number times ambulated in hallways past shift: 0  Number of times OOB to chair past shift: 1     Cardiac:   Cardiac Monitoring: NO      Cardiac Rhythm:      Access:   Current line(s): PIV   Genitourinary:   Urinary status: voiding  Urinary Catheter     Respiratory:   O2 Device: Nasal cannula 2 liters  Chronic home O2 use?: NO  Incentive spirometer at bedside: NO     GI:  Last Bowel Movement Date: 12/30/21  Current diet:  ADULT DIET Easy to Chew  Passing flatus: YES  Tolerating current diet: YES     Pain Management:   Patient states pain is manageable on current regimen: YES     Skin:  Luis Eduardo Score: 21  Interventions: float heels, increase time out of bed, PT/OT consult and nutritional support     Patient Safety:  Fall Score:  Total Score: 3  Interventions: bed/chair alarm, assistive device (walker, cane, etc), gripper socks, pt to call before getting OOB and stay with me (per policy)  High Fall Risk: Yes  @Rollbelt  @dexterity to release roll belt  Yes/No ( must document dexterity  here by stating Yes or No here, otherwise this is a restraint and must follow restraint documentation policy.)     DVT prophylaxis:  DVT prophylaxis Med- No  DVT prophylaxis SCD or JACQUI- Yes      Wounds: (If Applicable)  Wounds- No, but does have non-blanchable heels  Location      Active Consults:  IP CONSULT TO PALLIATIVE CARE - PROVIDER  IP CONSULT TO ONCOLOGY     Length of Stay:  Expected LOS: - - -  Actual LOS: 1  Discharge Plan: Yes home w/ hospice tomorrow

## 2021-12-31 NOTE — PROGRESS NOTES
Transition of Care Plan:     RUR: 9% - \"low risk\"  Disposition: Daughter's home with [facility] (1/1/22)  Follow up appointments: PCP & specialist as indicated   DME needed: [facility] to coordinate the delivery of any DME necessary for d/c  Transportation at Discharge: [facility] for 1/1/22 at 10:00 AM; [facility] reported arrival time of 9:45 AM. PCS completed, copy on chart. * CM informed [facility] that pt is returning home w/ hospice & requested transport crew to arrive on time for scheduled trip. [facility] instructed to contact the neurology unit nurses station if trip is delayed  [place] or means to access home: Pt's daughter has access to the home   IM Medicare Letter: 2nd IM needed prior to d/c  Is patient a BCPI-A Bundle: N/A         Is patient a  and connected with the [facility]? N/A  Caregiver Contact: Pt's daughter/[place] [employer]: [phone])  Discharge Caregiver contacted prior to discharge? To be contacted prior to d/c    Update - 4:11 PM: [facility] confirmed pick-up time for 9:45 AM 1/1/22. Update - 1:36 PM: CM informed MD of plan for d/c & requested his signature on DDNR; MD verbalized understanding. Initial note: CM reviewed pt's chart prior to moving forward with d/c planning. CM consulted with [facility] RN Karina Richey) regarding disposition. [facility] reported  Hospice plans to admit pt under their care tomorrow (1/1/22) at daughter's home (address detailed on file). [facility] requested for CM to arrange medical transport for 1/1/22 at 10:00 AM; CM to complete request. [facility] transport requested for 10:00 AM 1/1/22; referral pending via [employer]. CM specified in referral sent to [facility] that pt is returning home with hospice & reiterated the importance of a timely arrival for scheduled trip. CM requested for [facility] to contact the neurology unit nurses station 1/1/22 if scheduled trip is delayed. PCS completed, copy placed on chart. CM placed [facility]'s information in pt's AVS for reference. CM to f/u with attending MD to report update regarding disposition & request his signature on DDNR form. CM will continue to follow & remain accessible for d/c planning. Care Management Interventions  PCP Verified by CM:  Yes  Palliative Care Criteria Met (RRAT>21 & CHF Dx)?: Yes  Palliative Consult Recommended?: Yes  Mode of Transport at Discharge: BLS ([facility] transport secured for 1/1/22 at 10:00 AM)  Hospital Transport Time of Discharge: 1000  Transition of Care Consult (CM Consult): Ena: Yes  MyChart Signup:  ([facility] to coordinate the delivery of any DME needed for d/c)  Discharge Durable Medical Equipment: No (Pt has a walker (at bedside), cane, shower seat, and grab bars in bathroom)  Physical Therapy Consult: No  Occupational Therapy Consult: No  Speech Therapy Consult: No  Support Systems: Friend/Neighbor,Child(priya)  Confirm Follow Up Transport: Family  The Plan for Transition of Care is Related to the Following Treatment Goals : hospice  The Patient and/or Patient Representative was Provided with a Choice of Provider and Agrees with the Discharge Plan?: Yes  Freedom of Choice List was Provided with Basic Dialogue that Supports the Patient's Individualized Plan of Care/Goals, Treatment Preferences and Shares the Quality Data Associated with the Providers?: Yes  [place] Resource Information Provided?: No  Discharge Location  Discharge Placement: Home with hospice (daughter's home with [facility] (1/1/22))    Flor Goddard, [address] [place] [address], [address] [place] [address]

## 2021-12-31 NOTE — PROGRESS NOTES
TRANSFER - IN REPORT:    Verbal report received from JENISE Cantu(name) on Pallavi Ing  being received from ED(unit) for routine progression of care      Report consisted of patients Situation, Background, Assessment and   Recommendations(SBAR). Information from the following report(s) SBAR, ED Summary, MAR, Recent Results and Cardiac Rhythm Sinus Tach was reviewed with the receiving nurse. Opportunity for questions and clarification was provided.       Teresa Mcrae RN

## 2021-12-31 NOTE — PROGRESS NOTES
Problem: Breathing Pattern - Ineffective  Goal: *Absence of hypoxia  Outcome: Progressing Towards Goal     Problem: Breathing Pattern - Ineffective  Goal: *Use of effective breathing techniques  Outcome: Progressing Towards Goal     Problem: Breathing Pattern - Ineffective  Goal: *PALLIATIVE CARE:  Alleviation of Dyspnea  Outcome: Progressing Towards Goal     Problem: Patient Education: Go to Patient Education Activity  Goal: Patient/Family Education  Outcome: Progressing Towards Goal     Problem: Falls - Risk of  Goal: *Absence of Falls  Description: Document Valentino Fall Risk and appropriate interventions in the flowsheet.   Outcome: Progressing Towards Goal  Note: Fall Risk Interventions:  Mobility Interventions: Bed/chair exit alarm         Medication Interventions: Bed/chair exit alarm    Elimination Interventions: Bed/chair exit alarm,Call light in reach              Problem: Patient Education: Go to Patient Education Activity  Goal: Patient/Family Education  Outcome: Progressing Towards Goal

## 2021-12-31 NOTE — HOSPICE
[facility] Help to Those in Need  [phone] [address] PRE-Admission   Discharge Summary  Patient Name: Nadya Hays  YOB: 1950  Age: 66 y.o. Date of PLANNED Hospice Admission: 2022  Hospice Attending: Dr. Raul Mckeon  Primary Care Physician: Fara Mendoza, DO     Home Hospice Address:  [place] [address]    Primary Contact and Phone:  David Abdulon \"Chloe\" Kandice Black [phone]    ADVANCE CARE PLANNING    Code Status: DNR  Durable DNR: [x]  Yes  []  No  No flowsheet data found. Mormon: OTHER   Home: TBD    HOSPICE SUMMARY     Verbal CTI of terminal diagnosis with life expectancy of 6 months or less received from: Dr. Raul Mckeon    For the Hospice Diagnosis of:   Metastatic Triple Negative Breast Ca    NCD: Requested      CLINICAL INFORMATION   Allergies: Allergies   Allergen Reactions    Barley Other (comments)     GI problems    Black Pepper Other (comments)     GI problems    Cinnamon Other (comments)     GI problems    Cottonseed Oil Other (comments)     GI problems    Malt Extract Other (comments)     GI problems    Oats Other (comments)     GI problems    Pineapple Other (comments)     GI problems         Currently this patient has:  [x] Supplemental O2 [x] Peripheral IV  [] PICC    [] PORT   [] Mathur Catheter [] NG Tube   [] PEG Tube [] Ostomy    [] AICD: Has ICD been deactivated? [] Yes [] Wounds      COVID Screening: Positive   Negative      ASSESSMENT & PLAN     1. Symptom Issues Identified: weak, bedbound, SOB, productive cough, RUE limb alert     2. Spiritual Issues Identified: none at this time    3. Psych/ Social/ Emotional Issues Identified:   Pt was living alone in [facility] with boyfriend living in [facility] above her. She will be going to live with her daughter, Hilton Newsome. CARE COORDINATION     1. Hospice Consents:  signed/scanned    2.  DME Ordered/Company/Delivery Plan:  bed, OBT, BSC, transport chair, 5L 02, ZULEYMA mattress via [employer] to be delivered today    3. Symptom Kit and other Medications Needs:   SRK w/Morphine and Ativan to be delivered by [employer] today    4. Home Admission Reservation:  Saturday afternoon    5. Transportation by: [facility]   Scheduled for: 10am Saturday         6. Verbal Report/Handoff given to:   Liaison to call report tomorrow    7. Phone number to Hospital:  [phone]    8.  Supplies/Wound Care:  Chux, briefs, gloves sent home with daughter today

## 2021-12-31 NOTE — PROGRESS NOTES
Bedside shift change report given to Poonam Kearney RN (oncoming nurse) by  Matty Pepper RN (offgoing nurse). Report included the following information SBAR, ED Summary, Recent Results and Cardiac Rhythm Sinus Tach     Shift worked: days   Shift summary and any significant changes:     Hospice consult completed, leaving to home tomorrow w/ hospice, 10Am    Concerns for physician to address:    Zone phone for oncoming shift:        Patient Information  Alejandro Gomes  66 y.o.  12/30/2021  3:32 AM by Mayela Danielson MD. Alejandro Gomes was admitted from Home     Problem List       Patient Active Problem List     Diagnosis Date Noted    Hypokalemia 12/30/2021    Acute respiratory failure with hypoxia ([facility] [address] [facility] 12/30/2021    Metastatic breast cancer ([facility] [address] 12/30/2021      No past medical history on file.     Core Measures:  CVA: No No  CHF:No No  PNA:No No     Activity:  Activity Level: Up with Assistance  Number times ambulated in hallways past shift: 0  Number of times OOB to chair past shift: 1     Cardiac:   Cardiac Monitoring: Yes      Cardiac Rhythm: Sinus Tachy     Access:   Current line(s): PIV   Genitourinary:   Urinary status: voiding  Urinary Catheter? No Placement Date  Reason Medically Necessary      Respiratory:   O2 Device: Nasal cannula 2 liters  Chronic home O2 use?: NO  Incentive spirometer at bedside: NO     GI:  Last Bowel Movement Date: 12/30/21  Current diet:  ADULT DIET Easy to Chew  Passing flatus: YES  Tolerating current diet: YES     Pain Management:   Patient states pain is manageable on current regimen: YES     Skin:  Luis Eduardo Score: 21  Interventions: float heels, increase time out of bed, PT/OT consult and nutritional support     Patient Safety:  Fall Score:  Total Score: 3  Interventions: bed/chair alarm, assistive device (walker, cane, etc), gripper socks, pt to call before getting OOB and stay with me (per policy)  High Fall Risk: Yes  @Rollbelt  @dexterity to release roll belt  Yes/No ( must document dexterity  here by stating Yes or No here, otherwise this is a restraint and must follow restraint documentation policy.)     DVT prophylaxis:  DVT prophylaxis Med- No  DVT prophylaxis SCD or JACQUI- Yes      Wounds: (If Applicable)  Wounds- No, but does have non-blanchable heels  Location      Active Consults:  IP CONSULT TO PALLIATIVE CARE - PROVIDER  IP CONSULT TO ONCOLOGY     Length of Stay:  Expected LOS: - - -  Actual LOS: 1  Discharge Plan: Yes home w/ hospice tomorrow

## 2021-12-31 NOTE — PROGRESS NOTES
End of Shift Note    Bedside shift change report given to Kasandra Doss RN (oncoming nurse) by Annette Paris RN (offgoing nurse). Report included the following information SBAR, ED Summary, Recent Results and Cardiac Rhythm Sinus Tach    Shift worked: nights   Shift summary and any significant changes:    Pt admitted from ED for SOB and productive cough (yellow sputum sometimes blood tinged). Pt has stage 4 breast cancer. Pt has Right Limb Alert and bilateral mastectomy scars. Pt also has non-blanchable erythema to both heels and some redness on the right big toe. Pt is able to use bedside commode but has dyspnea at rest. Currently on 2 liters O2 NC. Pt is deciding with family whether to discharge on Hospice vs HH. Concerns for physician to address: See above please   Zone phone for oncoming shift:  [phone]     Patient Information  Josh Farmer  66 y.o.  12/30/2021  3:32 AM by Regi Lacey MD. Josh Farmer was admitted from Home    Problem List  Patient Active Problem List    Diagnosis Date Noted    Hypokalemia 12/30/2021    Acute respiratory failure with hypoxia ([facility] [address] 12/30/2021    Metastatic breast cancer ([facility] [address] 12/30/2021     No past medical history on file. Core Measures:  CVA: No No  CHF:No No  PNA:No No    Activity:  Activity Level: Up with Assistance  Number times ambulated in hallways past shift: 0  Number of times OOB to chair past shift: 1    Cardiac:   Cardiac Monitoring: Yes      Cardiac Rhythm: Sinus Tachy    Access:   Current line(s): PIV   Genitourinary:   Urinary status: voiding  Urinary Catheter?  No Placement Date  Reason Medically Necessary     Respiratory:   O2 Device: Nasal cannula 2 liters  Chronic home O2 use?: NO  Incentive spirometer at bedside: NO       GI:  Last Bowel Movement Date: 12/30/21  Current diet:  ADULT DIET Easy to Chew  Passing flatus: YES  Tolerating current diet: YES       Pain Management:   Patient states pain is manageable on current regimen: YES    Skin:  Luis Eduardo Score: 21  Interventions: float heels, increase time out of bed, PT/OT consult and nutritional support     Patient Safety:  Fall Score:  Total Score: 3  Interventions: bed/chair alarm, assistive device (walker, cane, etc), gripper socks, pt to call before getting OOB and stay with me (per policy)  High Fall Risk: Yes  @Rollbelt  @dexterity to release roll belt  Yes/No ( must document dexterity  here by stating Yes or No here, otherwise this is a restraint and must follow restraint documentation policy.)    DVT prophylaxis:  DVT prophylaxis Med- No  DVT prophylaxis SCD or JACQUI- Yes     Wounds: (If Applicable)  Wounds- No, but does have non-blanchable heels  Location     Active Consults:  IP CONSULT TO PALLIATIVE CARE - PROVIDER  IP CONSULT TO ONCOLOGY    Length of Stay:  Expected LOS: - - -  Actual LOS: 1  Discharge Plan: Yes To home, hospice vs       Paulette Vincent RN

## 2021-12-31 NOTE — PROGRESS NOTES
Spiritual Care Assessment/Progress Note  [facility]      NAME: Sidra Tellez      MRN: [id number]  AGE: 66 y.o. SEX: female  Advent Affiliation: Other   Language: English     12/31/2021     Total Time (in minutes): 9     Spiritual Assessment begun in [facility] [employer] through conversation with:         [x]Patient        [x] Family    [] Friend(s)        Reason for Consult: Palliative Care, Initial/Spiritual Assessment     Spiritual beliefs: (Please include comment if needed)     [x] Identifies with a eyal tradition: Shinto         [x] Supported by a eyal community: [facility]            [] Claims no spiritual orientation:           [] Seeking spiritual identity:                [] Adheres to an individual form of spirituality:           [] Not able to assess:                           Identified resources for coping:      [x] Prayer                               [] Music                  [] Guided Imagery     [x] Family/friends                 [] Pet visits     [] Devotional reading                         [] Unknown     [x] Other: Her                                               Interventions offered during this visit: (See comments for more details)    Patient Interventions: Affirmation of emotions/emotional suffering,Catharsis/review of pertinent events in supportive environment,Coping skills reviewed/reinforced,Initial/Spiritual assessment, patient floor     Family/Friend(s):  Affirmation of emotions/emotional suffering,Catharsis/review of pertinent events in supportive environment,Coping skills reviewed/reinforced     Plan of Care:     [x] Support spiritual and/or cultural needs    [] Support AMD and/or advance care planning process      [] Support grieving process   [] Coordinate Rites and/or Rituals    [] Coordination with community clergy   [] No spiritual needs identified at this time   [] Detailed Plan of Care below (See Comments)  [] Make referral to Music Therapy  [] Make referral to Pet Therapy     [] Make referral to Addiction services  [] Make referral to [facility]  [] Make referral to Spiritual Care Partner  [] No future visits requested        [x] Contact Spiritual Care for further referrals     Comments:     Initial Palliative Care Assessment visit for Mrs. Leidy Cano in 3119. Reviewed the chart before visit. Patient was alert, a male visitor was present during the visit.  affirmed the visitor's supportive presence, explored her concerns or worries. Patient declined 's visit saying she has really good support from her  who is on the way. She attends the [employer] in [address].  affirmed her strong familial and spiritual support, advised of additional support availability. They were thankful.     [address] Laurie Jauregui Th.M, [facility] [address] Provider   Paging Service [phone])

## 2021-12-31 NOTE — HOSPICE
Hospice Liaison Note: met with patient and her daughter, Ravin Pretty bedside again this afternoon. They have decided that XRT would not be beneficial and would like to move forward with hospice. Patient signed DDNR which was placed on the hospital chart for MD signature. Meds and DME are being delivered tonight, transport is set for 10AM tomorrow 1/1 for an afternoon hospice admission. Please call me at [phone] to notify of any transport delay tomorrow.

## 2022-01-01 ENCOUNTER — HOME CARE VISIT (OUTPATIENT)
Dept: HOSPICE | Facility: HOSPICE | Age: 72
End: 2022-01-01
Payer: MEDICARE

## 2022-01-01 ENCOUNTER — HOME CARE VISIT (OUTPATIENT)
Dept: SCHEDULING | Facility: HOME HEALTH | Age: 72
End: 2022-01-01
Payer: MEDICARE

## 2022-01-01 VITALS — HEART RATE: 102 BPM | RESPIRATION RATE: 22 BRPM

## 2022-01-01 VITALS
RESPIRATION RATE: 22 BRPM | DIASTOLIC BLOOD PRESSURE: 84 MMHG | HEART RATE: 107 BPM | TEMPERATURE: 97.4 F | OXYGEN SATURATION: 97 % | SYSTOLIC BLOOD PRESSURE: 120 MMHG

## 2022-01-01 VITALS
SYSTOLIC BLOOD PRESSURE: 169 MMHG | TEMPERATURE: 97.7 F | RESPIRATION RATE: 22 BRPM | DIASTOLIC BLOOD PRESSURE: 111 MMHG | OXYGEN SATURATION: 97 % | HEART RATE: 124 BPM

## 2022-01-01 VITALS
TEMPERATURE: 97.9 F | HEART RATE: 107 BPM | RESPIRATION RATE: 21 BRPM | WEIGHT: 130 LBS | OXYGEN SATURATION: 98 % | DIASTOLIC BLOOD PRESSURE: 67 MMHG | SYSTOLIC BLOOD PRESSURE: 147 MMHG | HEIGHT: 63 IN | BODY MASS INDEX: 23.04 KG/M2

## 2022-01-01 VITALS — HEART RATE: 125 BPM | SYSTOLIC BLOOD PRESSURE: 88 MMHG | DIASTOLIC BLOOD PRESSURE: 40 MMHG | RESPIRATION RATE: 26 BRPM

## 2022-01-01 PROCEDURE — G0299 HHS/HOSPICE OF RN EA 15 MIN: HCPCS

## 2022-01-01 PROCEDURE — 74011000250 HC RX REV CODE- 250: Performed by: HOSPITALIST

## 2022-01-01 PROCEDURE — 77010033678 HC OXYGEN DAILY

## 2022-01-01 PROCEDURE — 74011250637 HC RX REV CODE- 250/637: Performed by: HOSPITALIST

## 2022-01-01 PROCEDURE — HOSPICE MEDICATION HC HH HOSPICE MEDICATION

## 2022-01-01 PROCEDURE — G0155 HHCP-SVS OF CSW,EA 15 MIN: HCPCS

## 2022-01-01 PROCEDURE — G0156 HHCP-SVS OF AIDE,EA 15 MIN: HCPCS

## 2022-01-01 PROCEDURE — 0651 HSPC ROUTINE HOME CARE

## 2022-01-01 PROCEDURE — 94640 AIRWAY INHALATION TREATMENT: CPT

## 2022-01-01 PROCEDURE — 74011636637 HC RX REV CODE- 636/637: Performed by: STUDENT IN AN ORGANIZED HEALTH CARE EDUCATION/TRAINING PROGRAM

## 2022-01-01 PROCEDURE — 3336500001 HSPC ELECTION

## 2022-01-01 PROCEDURE — 3331090004 HSPC SERVICE INTENSITY ADD-ON

## 2022-01-01 PROCEDURE — 94760 N-INVAS EAR/PLS OXIMETRY 1: CPT

## 2022-01-01 RX ORDER — PREDNISONE 20 MG/1
20 TABLET ORAL
Qty: 7 TABLET | Refills: 0 | Status: SHIPPED | OUTPATIENT
Start: 2022-01-01 | End: 2022-01-08

## 2022-01-01 RX ADMIN — PREDNISONE 20 MG: 20 TABLET ORAL at 09:01

## 2022-01-01 RX ADMIN — MORPHINE SULFATE 20 MG: 20 SOLUTION ORAL at 11:00

## 2022-01-01 RX ADMIN — LORAZEPAM 1 MG: 2 CONCENTRATE ORAL at 11:00

## 2022-01-01 RX ADMIN — MEGESTROL ACETATE 800 MG: 40 SUSPENSION ORAL at 09:00

## 2022-01-01 RX ADMIN — LORAZEPAM 1 MG: 2 CONCENTRATE ORAL at 12:08

## 2022-01-01 RX ADMIN — Medication 10 ML: at 05:24

## 2022-01-01 RX ADMIN — LEVETIRACETAM 500 MG: 500 TABLET, FILM COATED ORAL at 09:01

## 2022-01-01 RX ADMIN — Medication 10 ML: at 00:01

## 2022-01-01 RX ADMIN — MORPHINE SULFATE 20 MG: 20 SOLUTION ORAL at 11:16

## 2022-01-01 RX ADMIN — LEVALBUTEROL HYDROCHLORIDE 0.63 MG: 0.63 SOLUTION RESPIRATORY (INHALATION) at 11:10

## 2022-01-01 RX ADMIN — CETIRIZINE HYDROCHLORIDE 10 MG: 10 TABLET, FILM COATED ORAL at 09:01

## 2022-01-01 RX ADMIN — GUAIFENESIN 1200 MG: 600 TABLET, EXTENDED RELEASE ORAL at 09:01

## 2022-01-01 NOTE — PROGRESS NOTES
[facility] delayed, transport is estimated to arrive by 11:45am.     CM notified nursing.      Edvin Reynoso, [facility], CM  Diego Gao

## 2022-01-01 NOTE — ROUTINE PROCESS
Bedside shift change report given to Elkin VanRN (oncoming nurse) by  Lo Severino RN (offgoing nurse). Report included the following information SBAR, ED Summary, Recent Results      Shift worked: 7p -7a   Shift summary and any significant changes:     Possible discharge today   Concerns for physician to address: none   Zone phone for oncoming shift:  [phone]      Patient Information  Juve Simpson  66 y.o.  12/30/2021  3:32 AM by Primitivo Lucia MD. Juve Simpson was admitted from Home       No past medical history on file.     Core Measures:  CVA: No No  CHF:No No  PNA:No No     Activity:  Activity Level: Up with Assistance  Number times ambulated in hallways past shift: 0  Number of times OOB to chair past shift: 1     Cardiac:   Cardiac Monitoring: NO      Cardiac Rhythm:      Access:   Current line(s): PIV   Genitourinary:   Urinary status: voiding  Urinary Catheter     Respiratory:   O2 Device: Nasal cannula 2 liters  Chronic home O2 use?: NO  Incentive spirometer at bedside: NO     GI:  Last Bowel Movement Date: 12/30/21  Current diet:  ADULT DIET Easy to Chew  Passing flatus: YES  Tolerating current diet: YES     Pain Management:   Patient states pain is manageable on current regimen: YES     Skin:  Luis Eduardo Score: 21  Interventions: float heels, increase time out of bed, PT/OT consult and nutritional support     Patient Safety:  Fall Score:  Total Score: 3  Interventions: bed/chair alarm, assistive device (walker, cane, etc), gripper socks, pt to call before getting OOB and stay with me (per policy)  High Fall Risk: Yes  @Rollbelt  @dexterity to release roll belt  Yes/No ( must document dexterity  here by stating Yes or No here, otherwise this is a restraint and must follow restraint documentation policy.)     DVT prophylaxis:  DVT prophylaxis Med- No  DVT prophylaxis SCD or JACQUI- Yes      Wounds: (If Applicable)  Wounds- No, but does have non-blanchable heels  Location      Active Consults:  IP CONSULT TO PALLIATIVE CARE - PROVIDER  IP CONSULT TO ONCOLOGY     Length of Stay:  Expected LOS: - - -  Actual LOS: 1  Discharge Plan: Yes home w/ hospice tomorrow

## 2022-01-01 NOTE — DISCHARGE INSTRUCTIONS
HOSPITALIST DISCHARGE INSTRUCTIONS    NAME: Irina Santoyo   :  1950   MRN:  [id number]     Date/Time:  2022 8:05 AM    ADMIT DATE: 2021   DISCHARGE DATE: 2022     Attending Physician: Caleb Rodarte MD    DISCHARGE DIAGNOSIS:  Acute hypoxic respiratory failure with mild hemoptysis due to  Progression of metastatic breast cancer   Chronic voice hoarseness since brain radiation   Hypokalemia  Anorexia    MEDICATIONS:  See above    · It is important that you take the medication exactly as they are prescribed. · Keep your medication in the bottles provided by the pharmacist and keep a list of the medication names, dosages, and times to be taken in your wallet. · Do not take other medications without consulting your doctor. Pain Management: per above medications    What to do at Home    Recommended diet:  Regular Diet    Recommended activity: Activity as tolerated    If you have questions regarding the hospital related prescriptions or hospital related issues please call [facility] at . You can always direct your questions to your primary care doctor if you are unable to reach your hospital physician; your PCP works as an extension of your hospital doctor just like your hospital doctor is an extension of your PCP for your time at [facility]. If you experience any of the following symptoms then please call your primary care physician or return to the emergency room if you cannot get hold of your doctor:  Fever, chills, nausea, vomiting, diarrhea, change in mentation, falling, bleeding, shortness of breath    Additional Instructions:      Bring these papers with you to your follow up appointments. The papers will help your doctors be sure to continue the care plan from the hospital.              Information obtained by :  I understand that if any problems occur once I am at home I am to contact my physician.     I understand and acknowledge receipt of the instructions indicated above.                                                                                                                                            Physician's or R.N.'s Signature                                                                  Date/Time                                                                                                                                              Patient or Representative Signature                                                          Da

## 2022-01-01 NOTE — DISCHARGE SUMMARY
Hospitalist Discharge Summary     Patient ID:  Juan Bernstein  [id number]  47 y.o.  1950 12/30/2021    PCP on record: Yovany Hill DO    Admit date: 12/30/2021  Discharge date and time: 1/1/2022    DISCHARGE DIAGNOSIS:    Acute hypoxic respiratory failure with mild hemoptysis due to  Progression of metastatic breast cancer   Chronic voice hoarseness since brain radiation 8/21  Hypokalemia  Anorexia    CONSULTATIONS:  IP CONSULT TO PALLIATIVE CARE - PROVIDER  IP CONSULT TO ONCOLOGY    Excerpted HPI from H&P of Vanice Hodgkin, MD:  Juan Bernstein is a 66 y.o. female   from home via EMS for shortness of breath. Hx of Stage 4 Breast Cx with lymph nodes swollen near wind pipe causing increased work of breathing     Patient states she was having generalized chest tightness prior to coming by ems     Patient states she has coughed up blood past 2 days which is new, also had bloody vaginal discharge x1 which is also new        Had brain radiation ended in 8/2021 and voice chronically hoarse since. Been SOB for about 1 week, progressive but woke up at 2am gasping. No fever, chills. Been coughing up small amount of blood past 2 days. Been told that CT in November showed chest adenopathy progressing despite oral chemo. She was not ready for hospice. She has been on oral chemo but after 3 rounds oncologist has decided to put it on hold as it was making her too sick. Has appointment next week with oncologist.  SOB worsen with any little exertion. No chest pain.        ______________________________________________________________________  DISCHARGE SUMMARY/HOSPITAL COURSE:  for full details see H&P, daily progress notes, labs, consult notes.          Acute hypoxic respiratory failure with mild hemoptysis due to  Progression of metastatic breast cancer   --CTA chest with RUL pleural base mass with numerous scattered pulmonary nodular densitites and extensive hilar and mediastinal lymphadenopathy.  Patient intolerant of oral chemotx for breast cancer and it was halted to re-address next week. -- currently on 2 to 3 L nasal cannula. Change steroid to prednisone 20 mg daily for 7 days. Gave prednisone on discharge. --Palliative care on boardrecommended hospice  Hospice on boardpatient and family agrees for the home with hospice, discharged with hospice today. --oncology consult  --cont keppra for seizure prophylactic prevention due to brain mets.  Patient reports s/p XRT to brain in August.     Mild vaginal vs. Rectal bleeding  --monitor.  No clear etiology on the CT abdomen. Patient deferred the evaluation.     Chronic voice hoarseness since brain radiation 8/21  --denies any dysphagia or sorethroat  --ordered easy to chew diet     Hypokalemia 3.3  --kcl replacement     Anorexia  --cont megace         _______________________________________________________________________  Patient seen and examined by me on discharge day. Pertinent Findings:  Gen:    Not in distress  Chest: Clear lungs  CVS:   Regular rhythm. No edema  Abd:  Soft, not distended, not tender  Neuro:  Alert,   _______________________________________________________________________  DISCHARGE MEDICATIONS:   Current Discharge Medication List      CONTINUE these medications which have NOT CHANGED    Details   levETIRAcetam (Keppra) 500 mg tablet Take 500 mg by mouth two (2) times a day. megestroL (MEGACE) 400 mg/10 mL (40 mg/mL) suspension Take 800 mg by mouth daily. LORazepam (Ativan) 0.5 mg tablet Take 0.5 mg by mouth every six (6) hours as needed for Anxiety. HYDROcodone-chlorpheniramine (TUSSIONEX) 10-8 mg/5 mL suspension Take 5 mL by mouth every twelve (12) hours as needed for Cough. prochlorperazine (COMPAZINE) 10 mg tablet Take 10 mg by mouth every six (6) hours as needed for Nausea or Vomiting.       ondansetron hcl (Zofran) 8 mg tablet Take 8 mg by mouth every eight (8) hours as needed for Nausea or Vomiting.      loratadine (CLARITIN) 10 mg tablet Take 10 mg by mouth daily. guaiFENesin (Mucinex) 1,200 mg Ta12 ER tablet Take 1,200 mg by mouth two (2) times a day. fluticasone propionate (FLONASE NA) 1 Spray by Nasal route daily. Patient Follow Up Instructions: Activity: Activity as tolerated  Diet: Regular Diet  Wound Care: None needed    If you have questions regarding the hospital related prescriptions or hospital related issues please call [phone] [facility] at . You can always direct your questions to your primary care doctor if you are unable to reach your hospital physician; your PCP works as an extension of your hospital doctor just like your hospital doctor is an extension of your PCP for your time at [address] [facility]. If you experience any of the following symptoms then please call your primary care physician or return to the emergency room if you cannot get hold of your doctor:  Fever, chills, nausea, vomiting, diarrhea, change in mentation, falling, bleeding, shortness of breath    Follow-up Information     Follow up With Specialties Details [address]  This is your hospice provider.  Contact the office directly with any questions or concerns  [address]  [facility] [address]    [facility], [address]  [address] [place] [address]  [phone]          ________________________________________________________________    Risk of deterioration: Low    Condition at Discharge:  Stable  __________________________________________________________________    Disposition  Home with hospice services    ____________________________________________________________________    Code Status: DNR/DNI  ___________________________________________________________________      Total time in minutes spent coordinating this discharge (includes going over instructions, follow-up, prescriptions, and preparing report for sign off to her PCP) :  >30 minutes    Signed:  Diogo Tyson MD

## 2022-01-02 NOTE — HOSPICE
Tyron Edmondson  1950  66 y.o. Principle Hospice Diagnosis: Metastatic Breast Cancer  Diagnoses RELATED to the terminal prognosis: Hypokalemia, Anorexia    Other Diagnoses: None  Date of Hospice Admission: 2022  Hospice Attending Elected by Patient: Dr. Mamie Kapoor  Primary Care Physician: Dr. Chase Norris RN: Edgar Ko RN  Nurse CM: Elliott Goltz Worker: Berkley Perry  : Alejandro Momin DNR:  Yes    Service: Unknown     Home: Not discussed    Hospice Summary: Upon visit arrival pt. noted sitting in hospital bed with [facility] elevated. Alert and oriented x4. SOB with minimal exertion. O2 at 2lpm via nasal canula. Does have generalized pain and chest pain with occasional mild hemoptysis . Sherryle Moder especially upon coughing (due to affected lymph nodes swollen near wind pipe causing increased work of breathing). She has been prescribed Prednisone 20mg daily for 7 days due to that inflammation. She states the Tussionex helps with pain and sleep when taken at bedtime. She offers complaints of weakness and nausea and is pale in appearance. She requires assistance with all ADL's. She has no appetite though does try to sip on nutritional supplement drinks. ER Visits/ Hospitalizations in past year: 2+  Onset Date of Hospice Diagnosis: unknown  Summary of Disease Progression Leading to Hospice Diagnosis: Per Bettina Bray's note on 2022,  \"Progression of metastatic breast cancer . ..pt.had been told that a CT in November showed chest adenopathy progressing despite oral chemo. She was not ready for hospice. She has been on oral chemo but after 3 rounds oncologist has decided to put it on hold as it was making her too sick. .. SOB worsens with any little exertion.  --CTA chest with RUL pleural base mass with numerous scattered pulmonary nodular densitites and extensive hilar and mediastinal lymphadenopathy. Joby Dwain Joby Dwain Had brain radiation ended in 8/2021 and voice chronically hoarse since. \"      Use LCD Guidelines and list features:   Cancer Diagnoses:    _______  A. Disease with distant metastases at presentation OR    _____x__  B. Progression from an earlier stage of disease to metastatic disease with either:                          ____x____  1. continued decline in spite of therapy                          _____x___  2. patient declines further disease directed therapy          SPIRITUAL/Social/Emotional:              Declined  Services at this time    Psych/ Social/ Emotional Issues Identified:               None identified    Dr. Yu Hood contacted, agrees to serve as attending provider for hospice and provided verbal certification of terminal illness with prognosis of 6 months or less life xpectancy. Orders for hospice admission, medications and plan of treatment received. Medication reconciliation completed with Dr. Ernest Schlatter nebulizer treatments ordered for SOB/Wheezing.     Currently this patient has:  Supplemental O2:    Nebulizer machine and Duonebs ordered         MEDS:  I have reviewed the patient's medication list with MD and identified the following:  Nonformulary medications: None  Unrelated medications: None    IDT communication to include MD, SN, SW, CH and support team.

## 2022-01-02 NOTE — HOSPICE
This LCSW along with RN Adolfo Bernheim and Denzel Concepcion conducted visit to complete hospice addmission. Present during the admission were Ms. Laureen Wayne, her daughter Jona Matias, son-in-law Judith Hilario and friend Olivier Ching. They will be rotating in providing for patient's care and will also be seeking personal care assistance through the long term care plan that patient has. Ms. Laureen Wayne identifies as Mosque and is affiliated with [address] [place] [address]. She has expressed that she wants to be cremated but has not chosen a  home. She was a business analysis. She watched her grand-daughter for the first 17 months. Ms. Laureen Wayne enjoys making cards and has a craft room at her condo. She also enjoys reading romance novels and mysteries. She listens to country and [place] music and enjoys traveling. She use to sing on the [employer] choir but had to stop a couple of years ago. Ms. Laureen Wayne wants to stay alert and engage with her family. She wants to avoid medication that will make her sleep. She reported that her heating pad helps to relieve pain. She stopped eating meals approximately a week ago and started drinking Ensure and Boost but doesn't currently have an appetite. Ms. Laureen Wayne has a sister who lives a few hours away and they talk a couple times a week. Her sister is also currently battling cancer. Family expressed that they hope patient will be able to sit at the dinner table with them during meal time or join them in the family room for television. Want to find ways for patient and grand-daughter to spend time together. LCSW discussed activities to do with grand-daughter such as drawing a picture or making a craft together. LCSW discussed with family that 3year old can experience grief; although, it may be expressed differently. Reviewed ways to preserve Ms. Garrett's memory  and activity that patient and grand-daughter can do together such as making a shadow box, drawing, arts/crafts. Also discussed a memory blanket. Daughter was tearful. Ms. Katey Smith stated she was glad we were there to offer support because it is difficult on her daughter being the only child.

## 2022-01-04 NOTE — HOSPICE
LMSW arrived at the patient's home to complete an Initial Psychosocial Assessment for [facility]. The LMSW was greeted at the front door by the patient's DTR Flaquita Garcia. The home was clean and clutter free. The patient is a 65 y/o  female with a [facility] Dx. Metastatic breast cancer. The patient was received lying on her bed comfortably watching TV. Patient was alert and oriented x 2. Patient referred the assessment to her DTR. Flaquita Rinconjaron stated that the patient has been fighting breast cancer for many years and the cancer has now metastasize. Patient is currently bedbound and total care. Flaquita Garcia reported that the patient's appetite consists of a half can of boost and approximately 8-16 ounces of fluids a day. Patient can no longer safely bear weight and pivot from bed to [facility]. Patient's mood appeared to be appropriate and patient was accepting of hospice services. Flaquita Garcia stated that she is coping appropriately. Patient worked for [employer] for 30 + years. Patient was  to [place] for five years that ended in a divorce, they had a DTR Flaquita Garcia together. Patient has a boyfriend Vicente Garza that have been together for 25 years. Patient has a sister Ivory Flores who lives in [place]. Patient belongs to [address] [place] [address]. "[employer]. Patient has a  who will provide services. [facility] provided community resources, emotional support, and cremation services.

## 2022-01-05 PROBLEM — Z51.5 HOSPICE CARE: Status: ACTIVE | Noted: 2022-01-01

## 2022-01-05 NOTE — HOSPICE
adl visit. Patient given bath and discussed repositioning with tank; verbalized understanding. Patient did not tolerate well as patient became short of breath with weak cough. Vitals taken.   Updated Austyn Danielle of findings via email

## 2022-01-05 NOTE — HOSPICE
Patient laying in bed when writer arrived. Patient is alert and oriented x 3. wheezing noted in some lung lobes, shortness of breath noted while patient is talking with the use of oxygen at 2 liters. Patient daughter gave patient a does of morphine. Patient's shortness of breath resolved. Patient's is only drinking sips of water and Ensure. Writer explained medication and care plan. Writer  ask family to call with any questions or concerns. Dr Rosey John changed Guaifenesin and Keppra to liquid, writer explained orders to [place] patient's daughter.

## 2022-01-06 NOTE — HOSPICE
and [facility]. Patient is laying in bed with eyes closed. Patient is alert to self and place. Patient is having shortness of breath at rest writer ask daughter to give Morphine, daughter gave 5 mg of morphine and lorazepam. patient's fingers are bluish, around her lips are gray. Patient's blood pressure 88/40, Hr 125. Writer increased oxygen to 4 liters to help patient relax. writer spoke with Ronald Martini NP schedule Morphine 10 mg and lorazepam 1 mg every 3 hours and every hour as needed. Writer explained signs and symptoms of death to family they stated understanding. Writer explained new orders daughter stated ok and wrote orders down. Writer reviewed seizure precautions and to give Lorazepam 1 mg every 5 min x 3 and call hospice. Patients daughter gave morphine 5 mg after writer and daughter changed her brief. Writer ask family to call with any questions or concerns.

## 2022-01-07 NOTE — HOSPICE
RNCM referral for  support - family struggling w/ anticipatory grief. Upon arrival, patient was resting; at times she opened her eyes or spoke a little, but seemed tired. Daughter Mariza Streeter, son-in-law Carrillo Mclain and friend Elmo Lowe present and supportive. Offered active listening processing family's questions and concerns about the dying process. Reflected on end of life hopes and expectations. Engaged in life review: discussed patient's illness journey and choices to prioritize family (alexandr. her granddaughter) in light of health issues; Elmo Lowe shared about their friendship for 25 yrs and their shared love of traveling; Mariza Streeter expressed gratitude for patient's loving care as a mother and as a grandmother. Offered active listening validating family's grief and explored coping strategies. Discussed  plans: family noted patient's wish to be cremated; they are postponing any burial or service until later due to the pandemic. Completed spiritual history: patient and friend Elmo Lowe actively engaged in local [employer] for the last 25 yrs; they maintain a close connection to their former  Rosemarie Ye who will be coming for a visit. Facilitated music: sang patient's favorite [place] Spell \"It is well with my soul\" with family bedside to enhance peace and meaning on end of life journey. At the conclusion of visit, patient appeared calmer and thanked the team for their support.     Minerva et al. Assessing Spiritual Concerns in Palliative Care (Completed 22)  Need for Meaning in the Face of Sufferin (family processing anticipatory grief and transition towards end of life, but are supportive of patient's wishes for quality of life on hospice)  Need for integrity, legacy, generativity: 0 (family actively engaged in meaning-making with patient)   Concerns about relationships: family and/or significant others: 2 (patient's friend Elmo Lowe and daughter are openly engaging grief; other family members are struggling more; pandemic restrictions are adding burden to extended family in saying goodbye's)  Concern or fear of dying or death: 1 (family shared some concerns related to symptom management)  Issues related to making decisions about treatment: 0 (patient and family on board w/ hospice philosophy towards comfort and quality of life)  R/S Struggle: 1 (patient and friend Sekou Murrieta active in local [employer] for 25 yrs; former  supportive of family; welcome  support)  0--no evidence of unmet need; (0*--further assessment needed to clarify needs)  1--some evidence of unmet need  2--substantial evidence of unmet need 3--evidence of severe unmet need  Total Spiritual Distress Score: 5/18 (Low)

## 2022-01-07 NOTE — HOSPICE
Routine follow up visit to provide comfort and spiritual guidance to the patient, family and caregiver as they make their journey towards end of life. Upon arrival, patient was nearing final moments of end of life. Family gathered bedside included daughter, son-in-law, friends Johnathan Lynn and Phil Medellin. 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 Offered music and prayer to enhance peace. Family coping well and grieving appropriately at this time. Daughter requested  reach out to patient's sister Hue Munroe for grief support at [phone].

## 2022-01-08 ENCOUNTER — HOME CARE VISIT (OUTPATIENT)
Dept: HOSPICE | Facility: HOSPICE | Age: 72
End: 2022-01-08
Payer: MEDICARE

## 2022-01-08 NOTE — HOSPICE
pt passed peacefully with daughter, friend and MARGARET bedside. daughter tearful but grieving appropriately. offered  but declined as daughter reports Tamia Vega came today. appreciative of Tamia Vega and Hannah Mason. wasted morphine 120 mls, lorazepam 120 mls. Dr Sandra Walker notified. will notify DME.  [facility] and Jennifer to serve,

## 2022-01-10 ENCOUNTER — HOME CARE VISIT (OUTPATIENT)
Dept: HOSPICE | Facility: HOSPICE | Age: 72
End: 2022-01-10
Payer: MEDICARE